# Patient Record
Sex: MALE | Race: OTHER | HISPANIC OR LATINO | URBAN - METROPOLITAN AREA
[De-identification: names, ages, dates, MRNs, and addresses within clinical notes are randomized per-mention and may not be internally consistent; named-entity substitution may affect disease eponyms.]

---
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
## 2022-01-12 NOTE — ED BEHAVIORAL HEALTH ASSESSMENT NOTE - NS ED BHA MSE SPEECH VOLUME
Pt needed school letter and proof of negative test. Letter sent to Dads email.     WILLIS Munoz      
Normal

## 2022-09-29 ENCOUNTER — EMERGENCY (EMERGENCY)
Facility: HOSPITAL | Age: 22
LOS: 1 days | Discharge: HOME | End: 2022-09-29
Payer: MEDICAID

## 2022-09-29 VITALS
RESPIRATION RATE: 20 BRPM | SYSTOLIC BLOOD PRESSURE: 112 MMHG | HEIGHT: 66 IN | HEART RATE: 87 BPM | WEIGHT: 106.92 LBS | TEMPERATURE: 98 F | OXYGEN SATURATION: 99 % | DIASTOLIC BLOOD PRESSURE: 74 MMHG

## 2022-09-29 PROCEDURE — 93010 ELECTROCARDIOGRAM REPORT: CPT

## 2022-09-29 PROCEDURE — 99284 EMERGENCY DEPT VISIT MOD MDM: CPT | Mod: 25

## 2022-09-29 PROCEDURE — 99211 OFF/OP EST MAY X REQ PHY/QHP: CPT

## 2022-09-29 NOTE — ED ADULT TRIAGE NOTE - CHIEF COMPLAINT QUOTE
here for agitated behavior and "trashing house." Also with thoughts of hurting himself. No plan. hx bipolar disorder and autism.

## 2022-09-30 VITALS
HEART RATE: 69 BPM | OXYGEN SATURATION: 98 % | TEMPERATURE: 98 F | RESPIRATION RATE: 15 BRPM | DIASTOLIC BLOOD PRESSURE: 67 MMHG | SYSTOLIC BLOOD PRESSURE: 100 MMHG

## 2022-09-30 DIAGNOSIS — F84.0 AUTISTIC DISORDER: ICD-10-CM

## 2022-09-30 LAB
ALBUMIN SERPL ELPH-MCNC: 5 G/DL — SIGNIFICANT CHANGE UP (ref 3.3–5)
ALP SERPL-CCNC: 65 U/L — SIGNIFICANT CHANGE UP (ref 40–120)
ALT FLD-CCNC: 10 U/L — SIGNIFICANT CHANGE UP (ref 10–45)
AMPHET UR-MCNC: POSITIVE
ANION GAP SERPL CALC-SCNC: 10 MMOL/L — SIGNIFICANT CHANGE UP (ref 5–17)
APAP SERPL-MCNC: <15 UG/ML — SIGNIFICANT CHANGE UP (ref 10–30)
APPEARANCE UR: CLEAR — SIGNIFICANT CHANGE UP
AST SERPL-CCNC: 18 U/L — SIGNIFICANT CHANGE UP (ref 10–40)
BACTERIA # UR AUTO: NEGATIVE — SIGNIFICANT CHANGE UP
BARBITURATES UR SCN-MCNC: NEGATIVE — SIGNIFICANT CHANGE UP
BASOPHILS # BLD AUTO: 0.02 K/UL — SIGNIFICANT CHANGE UP (ref 0–0.2)
BASOPHILS NFR BLD AUTO: 0.3 % — SIGNIFICANT CHANGE UP (ref 0–2)
BENZODIAZ UR-MCNC: NEGATIVE — SIGNIFICANT CHANGE UP
BILIRUB SERPL-MCNC: 0.4 MG/DL — SIGNIFICANT CHANGE UP (ref 0.2–1.2)
BILIRUB UR-MCNC: NEGATIVE — SIGNIFICANT CHANGE UP
BUN SERPL-MCNC: 14 MG/DL — SIGNIFICANT CHANGE UP (ref 7–23)
CALCIUM SERPL-MCNC: 9.7 MG/DL — SIGNIFICANT CHANGE UP (ref 8.4–10.5)
CHLORIDE SERPL-SCNC: 106 MMOL/L — SIGNIFICANT CHANGE UP (ref 96–108)
CO2 SERPL-SCNC: 25 MMOL/L — SIGNIFICANT CHANGE UP (ref 22–31)
COCAINE METAB.OTHER UR-MCNC: NEGATIVE — SIGNIFICANT CHANGE UP
COLOR SPEC: YELLOW — SIGNIFICANT CHANGE UP
CREAT SERPL-MCNC: 0.67 MG/DL — SIGNIFICANT CHANGE UP (ref 0.5–1.3)
DIFF PNL FLD: NEGATIVE — SIGNIFICANT CHANGE UP
EGFR: 137 ML/MIN/1.73M2 — SIGNIFICANT CHANGE UP
EOSINOPHIL # BLD AUTO: 0 K/UL — SIGNIFICANT CHANGE UP (ref 0–0.5)
EOSINOPHIL NFR BLD AUTO: 0 % — SIGNIFICANT CHANGE UP (ref 0–6)
EPI CELLS # UR: 1 /HPF — SIGNIFICANT CHANGE UP
ETHANOL SERPL-MCNC: <10 MG/DL — SIGNIFICANT CHANGE UP (ref 0–10)
GLUCOSE SERPL-MCNC: 97 MG/DL — SIGNIFICANT CHANGE UP (ref 70–99)
GLUCOSE UR QL: NEGATIVE — SIGNIFICANT CHANGE UP
HCT VFR BLD CALC: 42.5 % — SIGNIFICANT CHANGE UP (ref 39–50)
HGB BLD-MCNC: 13.4 G/DL — SIGNIFICANT CHANGE UP (ref 13–17)
HYALINE CASTS # UR AUTO: 8 /LPF — HIGH (ref 0–2)
IMM GRANULOCYTES NFR BLD AUTO: 0.3 % — SIGNIFICANT CHANGE UP (ref 0–0.9)
KETONES UR-MCNC: SIGNIFICANT CHANGE UP
LEUKOCYTE ESTERASE UR-ACNC: NEGATIVE — SIGNIFICANT CHANGE UP
LYMPHOCYTES # BLD AUTO: 1.73 K/UL — SIGNIFICANT CHANGE UP (ref 1–3.3)
LYMPHOCYTES # BLD AUTO: 27.2 % — SIGNIFICANT CHANGE UP (ref 13–44)
MCHC RBC-ENTMCNC: 27.6 PG — SIGNIFICANT CHANGE UP (ref 27–34)
MCHC RBC-ENTMCNC: 31.5 GM/DL — LOW (ref 32–36)
MCV RBC AUTO: 87.6 FL — SIGNIFICANT CHANGE UP (ref 80–100)
METHADONE UR-MCNC: NEGATIVE — SIGNIFICANT CHANGE UP
MONOCYTES # BLD AUTO: 0.68 K/UL — SIGNIFICANT CHANGE UP (ref 0–0.9)
MONOCYTES NFR BLD AUTO: 10.7 % — SIGNIFICANT CHANGE UP (ref 2–14)
NEUTROPHILS # BLD AUTO: 3.92 K/UL — SIGNIFICANT CHANGE UP (ref 1.8–7.4)
NEUTROPHILS NFR BLD AUTO: 61.5 % — SIGNIFICANT CHANGE UP (ref 43–77)
NITRITE UR-MCNC: NEGATIVE — SIGNIFICANT CHANGE UP
NRBC # BLD: 0 /100 WBCS — SIGNIFICANT CHANGE UP (ref 0–0)
OPIATES UR-MCNC: NEGATIVE — SIGNIFICANT CHANGE UP
OXYCODONE UR-MCNC: NEGATIVE — SIGNIFICANT CHANGE UP
PCP SPEC-MCNC: SIGNIFICANT CHANGE UP
PCP UR-MCNC: NEGATIVE — SIGNIFICANT CHANGE UP
PH UR: 7 — SIGNIFICANT CHANGE UP (ref 5–8)
PLATELET # BLD AUTO: 183 K/UL — SIGNIFICANT CHANGE UP (ref 150–400)
POTASSIUM SERPL-MCNC: 4.7 MMOL/L — SIGNIFICANT CHANGE UP (ref 3.5–5.3)
POTASSIUM SERPL-SCNC: 4.7 MMOL/L — SIGNIFICANT CHANGE UP (ref 3.5–5.3)
PROT SERPL-MCNC: 7.6 G/DL — SIGNIFICANT CHANGE UP (ref 6–8.3)
PROT UR-MCNC: ABNORMAL
RBC # BLD: 4.85 M/UL — SIGNIFICANT CHANGE UP (ref 4.2–5.8)
RBC # FLD: 15.8 % — HIGH (ref 10.3–14.5)
RBC CASTS # UR COMP ASSIST: 0 /HPF — SIGNIFICANT CHANGE UP (ref 0–4)
SALICYLATES SERPL-MCNC: <2 MG/DL — LOW (ref 15–30)
SARS-COV-2 RNA SPEC QL NAA+PROBE: SIGNIFICANT CHANGE UP
SODIUM SERPL-SCNC: 141 MMOL/L — SIGNIFICANT CHANGE UP (ref 135–145)
SP GR SPEC: 1.03 — HIGH (ref 1.01–1.02)
THC UR QL: NEGATIVE — SIGNIFICANT CHANGE UP
UROBILINOGEN FLD QL: ABNORMAL
VALPROATE SERPL-MCNC: 69 UG/ML — SIGNIFICANT CHANGE UP (ref 50–100)
WBC # BLD: 6.37 K/UL — SIGNIFICANT CHANGE UP (ref 3.8–10.5)
WBC # FLD AUTO: 6.37 K/UL — SIGNIFICANT CHANGE UP (ref 3.8–10.5)
WBC UR QL: 1 /HPF — SIGNIFICANT CHANGE UP (ref 0–5)

## 2022-09-30 PROCEDURE — 80053 COMPREHEN METABOLIC PANEL: CPT

## 2022-09-30 PROCEDURE — 93005 ELECTROCARDIOGRAM TRACING: CPT

## 2022-09-30 PROCEDURE — 85025 COMPLETE CBC W/AUTO DIFF WBC: CPT

## 2022-09-30 PROCEDURE — 87635 SARS-COV-2 COVID-19 AMP PRB: CPT

## 2022-09-30 PROCEDURE — 80164 ASSAY DIPROPYLACETIC ACD TOT: CPT

## 2022-09-30 PROCEDURE — 90792 PSYCH DIAG EVAL W/MED SRVCS: CPT | Mod: 95

## 2022-09-30 PROCEDURE — 81001 URINALYSIS AUTO W/SCOPE: CPT

## 2022-09-30 PROCEDURE — 99285 EMERGENCY DEPT VISIT HI MDM: CPT

## 2022-09-30 PROCEDURE — 80307 DRUG TEST PRSMV CHEM ANLYZR: CPT

## 2022-09-30 RX ORDER — OLANZAPINE 15 MG/1
1 TABLET, FILM COATED ORAL
Qty: 5 | Refills: 0
Start: 2022-09-30

## 2022-09-30 NOTE — ED ADULT NURSE REASSESSMENT NOTE - NS ED NURSE REASSESS COMMENT FT1
pt's behavior has been calm and no episodes of aggression since he was bib by ems. 1:1 CO for s/i maintained. awaiting to be evaluated by [facility].

## 2022-09-30 NOTE — ED PROVIDER NOTE - OBJECTIVE STATEMENT
22 year old male history of bipolar disorder and autism presents for aggressive behavior. Patient was at home awaiting a visit from his dad who lives in [place] canceled plans resulting in patient getting very upset. Started to break things around the house and become physically aggressive with sister and grandmother who lives in the home. Did patient did endorse suicidal ideation at the time of event however mother states this is on par with previous aggressive episodes that patient has had in the past currently. Currently patient is not endorsing suicidal ideation or homicidal ideation. Patient has no complaints at this time.

## 2022-09-30 NOTE — ED ADULT NURSE REASSESSMENT NOTE - NS ED NURSE REASSESS COMMENT FT1
Social work and psych MD at bedside to speak to patient and family member. Patient calm and cooperative. 1:1 at bedside within arms reach. Safety measures maintained.

## 2022-09-30 NOTE — ED ADULT NURSE REASSESSMENT NOTE - NS ED NURSE REASSESS COMMENT FT1
Discharge paperwork reviewed with mother. Mother concerned regarding safe discharge. MD Waldron made aware and asked to speak to patient prior to discharge. Patient belongings returned. Safety measures maintained. No

## 2022-09-30 NOTE — ED PROVIDER NOTE - CLINICAL SUMMARY MEDICAL DECISION MAKING FREE TEXT BOX
22 old male history of bipolar disorder and autism presents for aggressive behavior and concern for suicidal ideation. Currently no suicidal ideation endorses and patient at baseline behavioral status. Will obtain labs and monitor for behavioral changes.

## 2022-09-30 NOTE — ED BEHAVIORAL HEALTH ASSESSMENT NOTE - HPI (INCLUDE ILLNESS QUALITY, SEVERITY, DURATION, TIMING, CONTEXT, MODIFYING FACTORS, ASSOCIATED SIGNS AND SYMPTOMS)
21yo  male, domiciled with family, in 12th grade, PPH of autism, ADHD and mood disorder, 7 prior inpatient psychiatric admissions (last 2 years ago per chart, though this is not noted on PSYKES), pt reports in outpt treatment but doesn't know where or names of medications, reports mom gives him medications which he takes, prior history of SIB and aggression when agitated, no known history of drug or alcohol abuse, PMH of absence seizures, asthma and enuresis per chart, consult called to evaluate agitation.     Collateral obtained from mother. Mother reports that for the past two months patient has been up and down with his behaviors. she reports he has been impulsive and stressed out. He was transitioned to 9th grade in the same school and had a rough start due to some issues with bussing. Patient was arriving to school late, grades dropped and patient became upset. Patient is also to transition to another school this year or next year because once he turns 20 he has to leave [employer]. Patient is aware of this and makes him anxious. Since he has had a rough start he states that he wants out of the school. Mother reports compliance with his outpatient medications. Psychiatrist started him on an antidepressant (prozac) 1 month ago, but mother admits that she had only started him on it for 3 days. She reports that during this time the school has been reporting that patient has appeared inattentive - questioned mother if patient was possibly having an absence seizure and she reports she was thinking this may be a possibility and wanted to follow up with his neurologist. She reports that since he started the prozac he has been complaining of headaches. He was agitated yesterday at home and punched a table and was yelling, which mother reports has happened before. She reports after this episode patient took about half an hour to calm down then slept the rest of the night. He also complains of lightheadedness during this time and patient is always remorseful after he acts out and asks mother for forgiveness. Mother reports patient had a depakote level last 2 months ago and it was WNL. His thyroid was also checked at that time and found to be WNL. No reported suicidal ideation.    Patient interviewed alone. Patient reports that he "had a headache and acted out". He reports taking tylenol and that it did not help. He reports that yesterday's headache was the worst. Patient denies SI/HI/I/P. Patient reports some depression and that he does not feel like talking to his friends lately. Patient denies changes in weight or appetite. Denies anhedonia, crying spells. Patient denies any triggers. Patient reports taking prozac for the past three days and that it helped a bit then he had a headache once from it. Patient denies any psychotic symptoms including paranoia, ideas of reference, thought insertion/broadcasting, or auditory/visual/olfactory/tactile/gustatory hallucinations. Patient denies manic symptoms including elevated mood, increased irritability, mood lability, distractibility, grandiosity, pressured speech, increase in goal-directed activity, or decreased need for sleep. Patient expressed remorse for his actions and reports he gets agitated when he is in pain. Patient is future oriented and wants to be an .     Case d/w patient's outpatient neurologist Dr. Rhiannon Ko ([phone]). Discussed that patient has had a new onset period for the past two weeks of a headache with agitation and behavioral changes followed by periods of drowsiness where he will sleep for hours afterwards. Patient also appears to be inattentive at school during this time period as well. She reports that his last EEG was last month and was WNL and that he has had prior abnormal EEGs. She recommends that he go to the ED for an evaluation at this time and if cleared, can follow up with her at his next scheduled appointment. 23yo  male, domiciled with family, in 12th grade, PPH of autism, ADHD and mood disorder, 7 prior inpatient psychiatric admissions (last 2 years ago per chart, though this is not noted on PSYKES), pt reports in outpt treatment but doesn't know where or names of medications, reports mom gives him medications which he takes, prior history of SIB and aggression when agitated, no known history of drug or alcohol abuse, PMH of absence seizures, asthma and enuresis per chart, consult called to evaluate agitation.     Pt is a limited historian. He presents as regretful and embarrassed regarding events of tonight. He reported that he 'started trashing the house, ripping up papers' and that he 'had an argument with my sister and bit her'. He reported he was upset that his dad promised that he would take him somewhere, and he cancelled and didn't tell him why. He reports he feels 'calm' now, denies mood sx/SI/HI, denies current pain. Reports he would like to be 'an officer' when he gets older, and that he enjoys playing video games, and that this helps him calm down when he gets upset.

## 2022-09-30 NOTE — ED PROVIDER NOTE - NSFOLLOWUPINSTRUCTIONS_ED_ALL_ED_FT
Self-Destructive Behavior      Self-destructive behavior, or dysregulated behavior, refers to behaviors that can harm the person who does them. Self-destructive behavior is often used as a way to cope with painful emotions and stress. It is often habit-forming and difficult to control without help. Certain self-destructive behaviors can result in serious injury or death.    Self-destructive behavior can be a symptom of a mental health condition and this may put you at risk for thoughts of suicide in the future. Treatment may be required.      What are the risks?    Self-destructive behavior has risks, which include:  •Hurting yourself to release tension or lessen emotional pain. This includes banging your head, cutting, scratching, or burning yourself.    •Eating problems, such as:   •Eating very little or not eating at all (anorexia).      •Eating and making yourself vomit (purging).      •Eating too much in a short time period (binge eating).      •Using medicine, such as laxatives and water pills, to lose weight.        •Drinking too much alcohol.      •Abusing drugs.      •Going on shopping sprees, spending recklessly, or gambling until you go into debt.      •Any type of addictive behavior, including gambling and shoplifting.      •Not setting healthy limits. This includes denying yourself proper rest and food in order to meet the needs of others.      •Self-directed violence and other forms of self-harming behavior. This may include suicide.        Follow these instructions at home:  A bottle of beer, a glass of wine, and a glass of hard liquor with a "do not drink" sign over them.   •Avoid alcohol and drugs.      •Keep all follow-up visits. This is important.        General tips and recommendations  A young person talking with a counselor.    •Talk with your health care provider. He or she may refer you to a mental health counselor for talk therapy.      •Work with a counselor to help you recognize the source of your problems, sort out your feelings, and find strategies to cope with stress and your feelings.      •Try to distract yourself with other activities until you are calm and are able to seek help, these include exercising, keeping a journal, or speaking with a friend.    •Find healthy coping strategies that work for you, such as:  •Exercise.      •Spending time in nature.      •Journaling.      •Relaxation methods, such as deep breathing or yoga.        •Learn to identify and avoid the things that trigger your self-destructive behavior.      •Get involved in a local support group.        Where to find more information    Learn more about self-destructive behavior by visiting these websites:  •National [place] on Mental Illness: www.govind.org      •Centers for Disease Control and Prevention: www.cdc.gov        Contact a health care provider if:    •You become ill or you get injured as a result of self-destructive behavior.      •You are ready to seek treatment for addiction.      •You need help finding a support group.        Get help right away if:    •Your behavior interferes with daily activities, such as work or school.      •You have thoughts of hurting yourself or others.      If you ever feel like you may hurt yourself or others, or have thoughts about taking your own life, get help right away. Go to your nearest emergency department or:   • Call your local emergency services (883 in the [place]        •  Call a suicide crisis helpline, such as the National Suicide Prevention Lifeline at 1-877.694.8170 or 788 in the [place] This is open 24 hours a day in the [place]        • Text the Crisis Text Line at 463754 (in the [place]         Summary    •Self-destructive behavior includes behaviors, such as cutting, eating disorders, drug or alcohol abuse, and gambling. These may feel good in the moment, but can be harmful over time.      •Self-destructive behavior is often used as a way to deal with painful emotions and stress. It is often habit-forming and difficult to control without help.      •See your health care provider or counselor if you have thoughts of hurting yourself or hurting others. He or she will help you recognize the source of your problems, understand your feelings, and get the help you need.      This information is not intended to replace advice given to you by your health care provider. Make sure you discuss any questions you have with your health care provider.

## 2022-09-30 NOTE — ED BEHAVIORAL HEALTH ASSESSMENT NOTE - SUMMARY
21yo  male, domiciled with family, in 12th grade, PPH of autism, ADHD and mood disorder, 7 prior inpatient psychiatric admissions (last 2 years ago per chart, though this is not noted on PSYKES), pt reports in outpt treatment but doesn't know where or names of medications, reports mom gives him medications which he takes, prior history of SIB and aggression when agitated, no known history of drug or alcohol abuse, PMH of absence seizures, asthma and enuresis per chart, consult called to evaluate agitation. 23yo  male, domiciled with family, in 12th grade, PPH of autism, ADHD and mood disorder, 7 prior inpatient psychiatric admissions (last 2 years ago per chart, though this is not noted on PSYKES), pt reports in outpt treatment but doesn't know where or names of medications, reports mom gives him medications which he takes, prior history of SIB and aggression when agitated, no known history of drug or alcohol abuse, PMH of absence seizures, asthma and enuresis per chart, consult called to evaluate agitation. Pt presents as calm in ED, has not required medications. He reported he was able to sleep, and is not in any pain. He reported feeling regret and guilt regarding his actions this evening, which may best be understood in the context of chronic low frustration tolerance with limited ability to employ coping skills. His mother provided collateral that neurology was considering increase in VPA for seizures, however, this medication increase may be performed on an outpatient basis. The patient is psychiatrically cleared to leave the ED.

## 2022-09-30 NOTE — ED PROVIDER NOTE - PROGRESS NOTE DETAILS
Bashir PGY4: Patient reassessed, at baseline mental status, comfortable, psych cleared for DC. Strict return precautions given. Will dc and patient will follow up w/ op psych. Sabra Banegas PGY-3 patient reassessed, spoke with mom, not comfortable with patient being at home, concern for safety. patient currently behaving well, no indication to admit to hospital as per psych. will have sw and psych Sabra Banegas PGY-3 patient seen by psych, dispo plan reinforced, no indication for hospitalization at this time, refer to psych note. will discharge patient with, mom bedside.

## 2022-09-30 NOTE — ED PROVIDER NOTE - ATTENDING CONTRIBUTION TO CARE
MD Almonte:  patient seen and evaluated with the resident.  I was present for key portions of the History & Physical, and I agree with the Impression & Plan.  MD Almonte:  23 yo M, bib mother for evaluation of aggressive behavior and agitation at home.   Context:  patient was expecting his father to come to [place] to see him, but he cancelled at the last minute, which sent the patient into a rage.  Patient admits to endorsing SI in the moment when he was most angry, but he does NOT want to hurt himself, and he regrets ever saying that he would hurt himself.   VS: wnl.  Physical Exam: young adult M, very thin habitus, NAD, NCAT, PERRL, EOMI, neck supple, RRR, CTA B, Abd: s/nd/nt, Ext: no edema, Neuro: AAOx3, ambulates w/o diff, strength 5/5 & symmetric throughout.  Psych:  denies SI/HI at this time, no A/V hallucinations, normal affect.    Taking POs w/o difficulty.    Impression:  behavior disturbance with SI at home.  Plan:   medical clearance, reassess after ED BH evaluation via Telepsych.
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
## 2022-09-30 NOTE — ED ADULT NURSE REASSESSMENT NOTE - NS ED NURSE REASSESS COMMENT FT1
After conversation with social work and psychology patient safe to be discharged. No changes made to dc paperwork from this morning. Security called to return belongings. Safety measures maintained.

## 2022-09-30 NOTE — ED PROVIDER NOTE - PATIENT PORTAL LINK FT
You can access the FollowMyHealth Patient Portal offered by [facility] by registering at the following website: [email or web address]. By joining [employer]’s FollowMyHealth portal, you will also be able to view your health information using other applications (apps) compatible with our system. You can access the FollowMyHealth Patient Portal offered by [facility] by registering at the following website: [email or web address]. By joining [employer]’s FollowMyHealth portal, you will also be able to view your health information using other applications (apps) compatible with our system.
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
## 2022-09-30 NOTE — ED ADULT NURSE NOTE - DRUG PRE-SCREENING (DAST -1)
[Very Good] : ~his/her~  mood as very good [2 - 4 times a month (2 pts)] : 2-4 times a month (2 points) [1 or 2 (0 pts)] : 1 or 2 (0 points) [Never (0 pts)] : Never (0 points) [Yes] : In the past 12 months have you used drugs other than those required for medical reasons? Yes [No falls in past year] : Patient reported no falls in the past year [0] : 2) Feeling down, depressed, or hopeless: Not at all (0) [None] : None [With Family] : lives with family [Unemployed] : unemployed [] :  [# Of Children ___] : has [unfilled] children [Sexually Active] : sexually active [Feels Safe at Home] : Feels safe at home [Patient reported mammogram was normal] : Patient reported mammogram was normal [Patient reported colonoscopy was normal] : Patient reported colonoscopy was normal [HIV test declined] : HIV test declined [Hepatitis C test declined] : Hepatitis C test declined [Reviewed no changes] : Reviewed, no changes [Never] : Never [PHQ-2 Negative - No further assessment needed] : PHQ-2 Negative - No further assessment needed [Audit-CScore] : 2 [de-identified] : Marijuana  [[id number]] : 0 [Change in mental status noted] : No change in mental status noted [High Risk Behavior] : no high risk behavior [Reports changes in hearing] : Reports no changes in hearing [Reports changes in vision] : Reports no changes in vision [MammogramDate] : 07/21 [ColonoscopyDate] : 10/21 [de-identified] : Homemaker [AdvancecareDate] : 07/22 Statement Selected

## 2022-09-30 NOTE — ED BEHAVIORAL HEALTH ASSESSMENT NOTE - RISK ASSESSMENT
low. risks include prior inpatient admissions, history of aggression, poor frustration tolerance. Protective factors include no suicide attempts, medication compliance, no access to guns, no global insomnia, no substance abuse, supportive family, willingness to seek help, no suicidal ideation or homicidal ideation, hopefulness for future. Low Acute Suicide Risk

## 2022-09-30 NOTE — ED ADULT NURSE REASSESSMENT NOTE - NS ED NURSE REASSESS COMMENT FT1
pt. was interviewed in a private room via Telepsych w/ 1:1 staff in the room. pt. was cooperative w/ psychiatric assessment. pt. maintained on 1:1 CO for s/i.

## 2022-09-30 NOTE — ED PROVIDER NOTE - NS ED ROS FT
General: no fever, chills  HENT: no nasal congestion, no sore throat  Eyes: no visual changes, no blurred vision  Neck: no neck pain  CV: denies chest pain, no palpitations  Resp: no difficulty breathing, no cough  Abdominal: no nausea, no vomiting, no diarrhea, no abdominal pain  MSK: no muscle aches  Neuro: no headaches  Skin: no rashes
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
## 2022-09-30 NOTE — ED BEHAVIORAL HEALTH NOTE - BEHAVIORAL HEALTH NOTE
==================  PRE-HOSPITAL COURSE  ===================  SOURCE:  Secondhand ED documentation & ED provider report  DETAILS: Patient was BIB mother due to pt becoming agitated and aggressive after finding out that his trip to [place] to see his father was canceled.     =========  ED COURSE  =========  BEHAVIOR: Patient was described to be currently calm and cooperative presenting with linear thought process and thought content WNL, is AAAOx4, is not violent/aggressive in the ED, denies current SI/HI/AVH, does not present with marks/lacerations on the body, appears to maintain proper hygiene.   TREATMENT:  None  VISITORS: Mother is at bedside and remains appropriate in the ED.

## 2023-02-02 ENCOUNTER — HOSPITAL ENCOUNTER (EMERGENCY)
Dept: HOSPITAL 74 - FER | Age: 23
Discharge: HOME | End: 2023-02-02
Payer: COMMERCIAL

## 2023-02-02 VITALS — HEART RATE: 77 BPM | DIASTOLIC BLOOD PRESSURE: 88 MMHG | SYSTOLIC BLOOD PRESSURE: 127 MMHG | RESPIRATION RATE: 15 BRPM

## 2023-02-02 VITALS — BODY MASS INDEX: 17.6 KG/M2

## 2023-02-02 VITALS — TEMPERATURE: 97.3 F

## 2023-02-02 DIAGNOSIS — J98.01: Primary | ICD-10-CM

## 2023-02-02 DIAGNOSIS — R07.9: ICD-10-CM

## 2023-02-02 LAB
ALBUMIN SERPL-MCNC: 4.2 G/DL (ref 3.4–5)
ALP SERPL-CCNC: 52 U/L (ref 45–117)
ALT SERPL-CCNC: 10 U/L (ref 13–61)
ANION GAP SERPL CALC-SCNC: 13 MMOL/L (ref 8–16)
AST SERPL-CCNC: 16 U/L (ref 15–37)
BILIRUB SERPL-MCNC: 0.6 MG/DL (ref 0.2–1)
BUN SERPL-MCNC: 15 MG/DL (ref 7–18)
CALCIUM SERPL-MCNC: 8.9 MG/DL (ref 8.5–10)
CHLORIDE SERPL-SCNC: 105 MMOL/L (ref 98–107)
CO2 SERPL-SCNC: 22 MMOL/L (ref 21–32)
CREAT SERPL-MCNC: 0.8 MG/DL (ref 0.55–1.3)
DEPRECATED RDW RBC AUTO: 16.1 % (ref 11.9–15.9)
GLUCOSE SERPL-MCNC: 94 MG/DL (ref 74–106)
HCT VFR BLD CALC: 38.5 % (ref 35.4–49)
HGB BLD-MCNC: 13 G/DL (ref 11.7–16.9)
INR BLD: 1.29 (ref 0.83–1.09)
MCH RBC QN AUTO: 28.9 PG (ref 25.7–33.7)
MCHC RBC AUTO-ENTMCNC: 33.8 G/DL (ref 32–35.9)
MCV RBC: 85.5 FL (ref 80–96)
PLATELET # BLD AUTO: 146.9 10^3/UL (ref 134–434)
PLATELET BLD QL SMEAR: ADEQUATE
PMV BLD: 10.6 FL (ref 7.5–11.1)
PROT SERPL-MCNC: 6.8 G/DL (ref 6.4–8.2)
PT PNL PPP: 14.9 SEC (ref 9.7–13)
RBC # BLD AUTO: 4.5 10^6/UL (ref 4–5.6)
SODIUM SERPL-SCNC: 140 MMOL/L (ref 136–145)
WBC # BLD AUTO: 6.2 10^3/UL (ref 4–10.8)

## 2023-02-02 PROCEDURE — 3E0F7GC INTRODUCTION OF OTHER THERAPEUTIC SUBSTANCE INTO RESPIRATORY TRACT, VIA NATURAL OR ARTIFICIAL OPENING: ICD-10-PCS | Performed by: EMERGENCY MEDICINE

## 2023-02-02 RX ADMIN — IPRATROPIUM BROMIDE AND ALBUTEROL SULFATE SCH AMP: .5; 3 SOLUTION RESPIRATORY (INHALATION) at 16:07

## 2023-02-02 RX ADMIN — IPRATROPIUM BROMIDE AND ALBUTEROL SULFATE SCH AMP: .5; 3 SOLUTION RESPIRATORY (INHALATION) at 16:28

## 2023-02-02 RX ADMIN — IPRATROPIUM BROMIDE AND ALBUTEROL SULFATE SCH AMP: .5; 3 SOLUTION RESPIRATORY (INHALATION) at 16:49

## 2023-03-07 ENCOUNTER — EMERGENCY (EMERGENCY)
Facility: HOSPITAL | Age: 23
LOS: 1 days | Discharge: HOME | End: 2023-03-07
Attending: EMERGENCY MEDICINE
Payer: MEDICAID

## 2023-03-07 VITALS
TEMPERATURE: 98 F | HEART RATE: 67 BPM | SYSTOLIC BLOOD PRESSURE: 113 MMHG | HEIGHT: 66 IN | OXYGEN SATURATION: 100 % | WEIGHT: 49.82 LBS | RESPIRATION RATE: 17 BRPM | DIASTOLIC BLOOD PRESSURE: 67 MMHG

## 2023-03-07 VITALS
RESPIRATION RATE: 19 BRPM | SYSTOLIC BLOOD PRESSURE: 93 MMHG | OXYGEN SATURATION: 99 % | DIASTOLIC BLOOD PRESSURE: 53 MMHG | HEART RATE: 98 BPM | TEMPERATURE: 99 F

## 2023-03-07 LAB
ALBUMIN SERPL ELPH-MCNC: 3.4 G/DL — LOW (ref 3.5–5)
ALP SERPL-CCNC: 55 U/L — SIGNIFICANT CHANGE UP (ref 40–120)
ALT FLD-CCNC: 16 U/L DA — SIGNIFICANT CHANGE UP (ref 10–60)
ANION GAP SERPL CALC-SCNC: 3 MMOL/L — LOW (ref 5–17)
AST SERPL-CCNC: 18 U/L — SIGNIFICANT CHANGE UP (ref 10–40)
BASOPHILS # BLD AUTO: 0.01 K/UL — SIGNIFICANT CHANGE UP (ref 0–0.2)
BASOPHILS NFR BLD AUTO: 0.2 % — SIGNIFICANT CHANGE UP (ref 0–2)
BILIRUB SERPL-MCNC: 0.2 MG/DL — SIGNIFICANT CHANGE UP (ref 0.2–1.2)
BUN SERPL-MCNC: 10 MG/DL — SIGNIFICANT CHANGE UP (ref 7–18)
CALCIUM SERPL-MCNC: 8.7 MG/DL — SIGNIFICANT CHANGE UP (ref 8.4–10.5)
CHLORIDE SERPL-SCNC: 112 MMOL/L — HIGH (ref 96–108)
CO2 SERPL-SCNC: 26 MMOL/L — SIGNIFICANT CHANGE UP (ref 22–31)
CREAT SERPL-MCNC: 0.71 MG/DL — SIGNIFICANT CHANGE UP (ref 0.5–1.3)
EGFR: 135 ML/MIN/1.73M2 — SIGNIFICANT CHANGE UP
EOSINOPHIL # BLD AUTO: 0.02 K/UL — SIGNIFICANT CHANGE UP (ref 0–0.5)
EOSINOPHIL NFR BLD AUTO: 0.5 % — SIGNIFICANT CHANGE UP (ref 0–6)
GLUCOSE SERPL-MCNC: 108 MG/DL — HIGH (ref 70–99)
HCT VFR BLD CALC: 39.5 % — SIGNIFICANT CHANGE UP (ref 39–50)
HGB BLD-MCNC: 12.6 G/DL — LOW (ref 13–17)
IMM GRANULOCYTES NFR BLD AUTO: 0.2 % — SIGNIFICANT CHANGE UP (ref 0–0.9)
LYMPHOCYTES # BLD AUTO: 2.2 K/UL — SIGNIFICANT CHANGE UP (ref 1–3.3)
LYMPHOCYTES # BLD AUTO: 53.9 % — HIGH (ref 13–44)
MAGNESIUM SERPL-MCNC: 2.1 MG/DL — SIGNIFICANT CHANGE UP (ref 1.6–2.6)
MCHC RBC-ENTMCNC: 28.4 PG — SIGNIFICANT CHANGE UP (ref 27–34)
MCHC RBC-ENTMCNC: 31.9 GM/DL — LOW (ref 32–36)
MCV RBC AUTO: 89 FL — SIGNIFICANT CHANGE UP (ref 80–100)
MONOCYTES # BLD AUTO: 0.52 K/UL — SIGNIFICANT CHANGE UP (ref 0–0.9)
MONOCYTES NFR BLD AUTO: 12.7 % — SIGNIFICANT CHANGE UP (ref 2–14)
NEUTROPHILS # BLD AUTO: 1.32 K/UL — LOW (ref 1.8–7.4)
NEUTROPHILS NFR BLD AUTO: 32.5 % — LOW (ref 43–77)
NRBC # BLD: 0 /100 WBCS — SIGNIFICANT CHANGE UP (ref 0–0)
PLATELET # BLD AUTO: 177 K/UL — SIGNIFICANT CHANGE UP (ref 150–400)
POTASSIUM SERPL-MCNC: 4 MMOL/L — SIGNIFICANT CHANGE UP (ref 3.5–5.3)
POTASSIUM SERPL-SCNC: 4 MMOL/L — SIGNIFICANT CHANGE UP (ref 3.5–5.3)
PROT SERPL-MCNC: 6.5 G/DL — SIGNIFICANT CHANGE UP (ref 6–8.3)
RBC # BLD: 4.44 M/UL — SIGNIFICANT CHANGE UP (ref 4.2–5.8)
RBC # FLD: 15.9 % — HIGH (ref 10.3–14.5)
SODIUM SERPL-SCNC: 141 MMOL/L — SIGNIFICANT CHANGE UP (ref 135–145)
VALPROATE SERPL-MCNC: 104 UG/ML — HIGH (ref 50–100)
WBC # BLD: 4.08 K/UL — SIGNIFICANT CHANGE UP (ref 3.8–10.5)
WBC # FLD AUTO: 4.08 K/UL — SIGNIFICANT CHANGE UP (ref 3.8–10.5)

## 2023-03-07 PROCEDURE — 96374 THER/PROPH/DIAG INJ IV PUSH: CPT

## 2023-03-07 PROCEDURE — 96375 TX/PRO/DX INJ NEW DRUG ADDON: CPT

## 2023-03-07 PROCEDURE — 83735 ASSAY OF MAGNESIUM: CPT

## 2023-03-07 PROCEDURE — 99284 EMERGENCY DEPT VISIT MOD MDM: CPT

## 2023-03-07 PROCEDURE — 80053 COMPREHEN METABOLIC PANEL: CPT

## 2023-03-07 PROCEDURE — 80164 ASSAY DIPROPYLACETIC ACD TOT: CPT

## 2023-03-07 PROCEDURE — 99284 EMERGENCY DEPT VISIT MOD MDM: CPT | Mod: 25

## 2023-03-07 PROCEDURE — 85025 COMPLETE CBC W/AUTO DIFF WBC: CPT

## 2023-03-07 PROCEDURE — 36415 COLL VENOUS BLD VENIPUNCTURE: CPT

## 2023-03-07 RX ORDER — METOCLOPRAMIDE HCL 10 MG
10 TABLET ORAL ONCE
Refills: 0 | Status: COMPLETED | OUTPATIENT
Start: 2023-03-07 | End: 2023-03-07

## 2023-03-07 RX ORDER — KETOROLAC TROMETHAMINE 30 MG/ML
15 SYRINGE (ML) INJECTION ONCE
Refills: 0 | Status: DISCONTINUED | OUTPATIENT
Start: 2023-03-07 | End: 2023-03-07

## 2023-03-07 RX ORDER — MAGNESIUM SULFATE 500 MG/ML
1 VIAL (ML) INJECTION ONCE
Refills: 0 | Status: COMPLETED | OUTPATIENT
Start: 2023-03-07 | End: 2023-03-07

## 2023-03-07 RX ORDER — SODIUM CHLORIDE 9 MG/ML
1000 INJECTION INTRAMUSCULAR; INTRAVENOUS; SUBCUTANEOUS ONCE
Refills: 0 | Status: COMPLETED | OUTPATIENT
Start: 2023-03-07 | End: 2023-03-07

## 2023-03-07 RX ADMIN — SODIUM CHLORIDE 1000 MILLILITER(S): 9 INJECTION INTRAMUSCULAR; INTRAVENOUS; SUBCUTANEOUS at 15:57

## 2023-03-07 RX ADMIN — Medication 200 GRAM(S): at 15:57

## 2023-03-07 RX ADMIN — Medication 15 MILLIGRAM(S): at 15:57

## 2023-03-07 RX ADMIN — Medication 15 MILLIGRAM(S): at 16:09

## 2023-03-07 RX ADMIN — Medication 104 MILLIGRAM(S): at 15:57

## 2023-03-07 NOTE — ED PROVIDER NOTE - CLINICAL SUMMARY MEDICAL DECISION MAKING FREE TEXT BOX
22 yr old male with hx of absent seizure on depakote 500mg BID x 1 week now as prior was on 625mg BID, Bipolar, ADHD, and severe weigh fluctuations presents to ed with mother for frontal headache with nausea, arm pain, dizziness on and off 1 month. mother requesting to have EEG done. no fever, no abd pain, no trauma. Seeing adult neuro- Dr Enrique    headache possibly migraine vs tension. clinically pt is not having active seizure. pt known seizure d/o making an EEG in ed clinically not significant. will check depakote level and basic labs. fluids, migraine cocktail, likely dc with neuro outpt follow up. pt neurologically at baseline no need for imaging

## 2023-03-07 NOTE — ED PROVIDER NOTE - PROGRESS NOTE DETAILS
montero: pt neurologically intact. well appearing. no seizure activity.  104 high normal 100. dc with neuro follow up

## 2023-03-07 NOTE — ED PROVIDER NOTE - NSFOLLOWUPINSTRUCTIONS_ED_ALL_ED_FT
Your headache is likely migraine induced. Please stay hydrated. continue with home meds and see your neurologist

## 2023-03-07 NOTE — ED PROVIDER NOTE - PATIENT PORTAL LINK FT
You can access the FollowMyHealth Patient Portal offered by [facility] by registering at the following website: [email or web address]. By joining [employer]’s FollowMyHealth portal, you will also be able to view your health information using other applications (apps) compatible with our system.

## 2023-03-07 NOTE — ED PROVIDER NOTE - ENMT, MLM
Airway patent, Nasal mucosa clear. Mouth with normal mucosa. Throat has no vesicles, no oropharyngeal exudates and uvula is midline. no tongue lac

## 2023-03-07 NOTE — ED PROVIDER NOTE - OBJECTIVE STATEMENT
22 yr old male with hx of absent seizure on depakote 500mg BID x 1 week now as prior was on 625mg BID, Bipolar, ADHD, and severe weigh fluctuations presents to ed with mother for frontal headache with nausea, arm pain, dizziness on and off 1 month. mother requesting to have EEG done. no fever, no abd pain, no trauma. Seeing adult neuro- Dr Enrique

## 2023-03-07 NOTE — ED ADULT NURSE NOTE - OBJECTIVE STATEMENT
22 yr old male patient presents to the ER AOX4 complains of headaches on and off for the past month. Hist of silent  seizure, mild autistic and bipolar. Denied any fever, nausea, vomiting and dizziness. Patient sated that his headaches is not as bad as it was this morning because mom gives him tylenol. Will continue to monitor for nay changes.

## 2024-04-03 ENCOUNTER — EMERGENCY (EMERGENCY)
Facility: HOSPITAL | Age: 24
LOS: 1 days | Discharge: HOME | End: 2024-04-03
Attending: EMERGENCY MEDICINE
Payer: MEDICAID

## 2024-04-03 VITALS
TEMPERATURE: 97 F | HEART RATE: 75 BPM | WEIGHT: 122.36 LBS | DIASTOLIC BLOOD PRESSURE: 67 MMHG | OXYGEN SATURATION: 97 % | SYSTOLIC BLOOD PRESSURE: 97 MMHG | RESPIRATION RATE: 18 BRPM

## 2024-04-03 VITALS
RESPIRATION RATE: 18 BRPM | OXYGEN SATURATION: 98 % | SYSTOLIC BLOOD PRESSURE: 97 MMHG | DIASTOLIC BLOOD PRESSURE: 65 MMHG | TEMPERATURE: 98 F | HEART RATE: 74 BPM

## 2024-04-03 DIAGNOSIS — Z90.89 ACQUIRED ABSENCE OF OTHER ORGANS: Chronic | ICD-10-CM

## 2024-04-03 LAB
ALBUMIN SERPL ELPH-MCNC: 2.9 G/DL — LOW (ref 3.5–5)
ALP SERPL-CCNC: 47 U/L — SIGNIFICANT CHANGE UP (ref 40–120)
ALT FLD-CCNC: 17 U/L DA — SIGNIFICANT CHANGE UP (ref 10–60)
ANION GAP SERPL CALC-SCNC: 6 MMOL/L — SIGNIFICANT CHANGE UP (ref 5–17)
ANISOCYTOSIS BLD QL: SLIGHT — SIGNIFICANT CHANGE UP
APPEARANCE UR: CLEAR — SIGNIFICANT CHANGE UP
AST SERPL-CCNC: 11 U/L — SIGNIFICANT CHANGE UP (ref 10–40)
BASOPHILS # BLD AUTO: 0 K/UL — SIGNIFICANT CHANGE UP (ref 0–0.2)
BASOPHILS NFR BLD AUTO: 0 % — SIGNIFICANT CHANGE UP (ref 0–2)
BILIRUB SERPL-MCNC: 0.3 MG/DL — SIGNIFICANT CHANGE UP (ref 0.2–1.2)
BILIRUB UR-MCNC: NEGATIVE — SIGNIFICANT CHANGE UP
BUN SERPL-MCNC: 13 MG/DL — SIGNIFICANT CHANGE UP (ref 7–18)
CALCIUM SERPL-MCNC: 8.8 MG/DL — SIGNIFICANT CHANGE UP (ref 8.4–10.5)
CHLORIDE SERPL-SCNC: 110 MMOL/L — HIGH (ref 96–108)
CO2 SERPL-SCNC: 22 MMOL/L — SIGNIFICANT CHANGE UP (ref 22–31)
COLOR SPEC: SIGNIFICANT CHANGE UP
CREAT SERPL-MCNC: 0.84 MG/DL — SIGNIFICANT CHANGE UP (ref 0.5–1.3)
DIFF PNL FLD: NEGATIVE — SIGNIFICANT CHANGE UP
EGFR: 127 ML/MIN/1.73M2 — SIGNIFICANT CHANGE UP
EOSINOPHIL # BLD AUTO: 0 K/UL — SIGNIFICANT CHANGE UP (ref 0–0.5)
EOSINOPHIL NFR BLD AUTO: 0 % — SIGNIFICANT CHANGE UP (ref 0–6)
GLUCOSE SERPL-MCNC: 99 MG/DL — SIGNIFICANT CHANGE UP (ref 70–99)
GLUCOSE UR QL: NEGATIVE MG/DL — SIGNIFICANT CHANGE UP
HCT VFR BLD CALC: 40.8 % — SIGNIFICANT CHANGE UP (ref 39–50)
HGB BLD-MCNC: 13.2 G/DL — SIGNIFICANT CHANGE UP (ref 13–17)
KETONES UR-MCNC: 80 MG/DL
LEUKOCYTE ESTERASE UR-ACNC: NEGATIVE — SIGNIFICANT CHANGE UP
LG PLATELETS BLD QL AUTO: SLIGHT — SIGNIFICANT CHANGE UP
LIDOCAIN IGE QN: 21 U/L — SIGNIFICANT CHANGE UP (ref 13–75)
LYMPHOCYTES # BLD AUTO: 1.69 K/UL — SIGNIFICANT CHANGE UP (ref 1–3.3)
LYMPHOCYTES # BLD AUTO: 21 % — SIGNIFICANT CHANGE UP (ref 13–44)
MANUAL SMEAR VERIFICATION: SIGNIFICANT CHANGE UP
MCHC RBC-ENTMCNC: 27.5 PG — SIGNIFICANT CHANGE UP (ref 27–34)
MCHC RBC-ENTMCNC: 32.4 GM/DL — SIGNIFICANT CHANGE UP (ref 32–36)
MCV RBC AUTO: 85 FL — SIGNIFICANT CHANGE UP (ref 80–100)
METAMYELOCYTES # FLD: 2 % — HIGH (ref 0–0)
MICROCYTES BLD QL: SLIGHT — SIGNIFICANT CHANGE UP
MONOCYTES # BLD AUTO: 1.53 K/UL — HIGH (ref 0–0.9)
MONOCYTES NFR BLD AUTO: 19 % — HIGH (ref 2–14)
MYELOCYTES NFR BLD: 1 % — HIGH (ref 0–0)
NEUTROPHILS # BLD AUTO: 4.52 K/UL — SIGNIFICANT CHANGE UP (ref 1.8–7.4)
NEUTROPHILS NFR BLD AUTO: 53 % — SIGNIFICANT CHANGE UP (ref 43–77)
NEUTS BAND # BLD: 3 % — SIGNIFICANT CHANGE UP (ref 0–8)
NITRITE UR-MCNC: NEGATIVE — SIGNIFICANT CHANGE UP
NRBC # BLD: 0 /100 WBCS — SIGNIFICANT CHANGE UP (ref 0–0)
PH UR: 5.5 — SIGNIFICANT CHANGE UP (ref 5–8)
PLAT MORPH BLD: NORMAL — SIGNIFICANT CHANGE UP
PLATELET # BLD AUTO: 206 K/UL — SIGNIFICANT CHANGE UP (ref 150–400)
POIKILOCYTOSIS BLD QL AUTO: SLIGHT — SIGNIFICANT CHANGE UP
POTASSIUM SERPL-MCNC: 3.9 MMOL/L — SIGNIFICANT CHANGE UP (ref 3.5–5.3)
POTASSIUM SERPL-SCNC: 3.9 MMOL/L — SIGNIFICANT CHANGE UP (ref 3.5–5.3)
PROT SERPL-MCNC: 6.4 G/DL — SIGNIFICANT CHANGE UP (ref 6–8.3)
PROT UR-MCNC: NEGATIVE MG/DL — SIGNIFICANT CHANGE UP
RBC # BLD: 4.8 M/UL — SIGNIFICANT CHANGE UP (ref 4.2–5.8)
RBC # FLD: 16.7 % — HIGH (ref 10.3–14.5)
RBC BLD AUTO: ABNORMAL
SODIUM SERPL-SCNC: 138 MMOL/L — SIGNIFICANT CHANGE UP (ref 135–145)
SP GR SPEC: 1.02 — SIGNIFICANT CHANGE UP (ref 1–1.03)
TROPONIN I, HIGH SENSITIVITY RESULT: 3.4 NG/L — SIGNIFICANT CHANGE UP
UROBILINOGEN FLD QL: 0.2 MG/DL — SIGNIFICANT CHANGE UP (ref 0.2–1)
VARIANT LYMPHS # BLD: 1 % — SIGNIFICANT CHANGE UP (ref 0–6)
WBC # BLD: 8.07 K/UL — SIGNIFICANT CHANGE UP (ref 3.8–10.5)
WBC # FLD AUTO: 8.07 K/UL — SIGNIFICANT CHANGE UP (ref 3.8–10.5)

## 2024-04-03 PROCEDURE — 83690 ASSAY OF LIPASE: CPT

## 2024-04-03 PROCEDURE — 71045 X-RAY EXAM CHEST 1 VIEW: CPT

## 2024-04-03 PROCEDURE — 36415 COLL VENOUS BLD VENIPUNCTURE: CPT

## 2024-04-03 PROCEDURE — 99285 EMERGENCY DEPT VISIT HI MDM: CPT

## 2024-04-03 PROCEDURE — 96374 THER/PROPH/DIAG INJ IV PUSH: CPT

## 2024-04-03 PROCEDURE — 99285 EMERGENCY DEPT VISIT HI MDM: CPT | Mod: 25

## 2024-04-03 PROCEDURE — 71045 X-RAY EXAM CHEST 1 VIEW: CPT | Mod: 26

## 2024-04-03 PROCEDURE — 93005 ELECTROCARDIOGRAM TRACING: CPT

## 2024-04-03 PROCEDURE — 87177 OVA AND PARASITES SMEARS: CPT

## 2024-04-03 PROCEDURE — 80053 COMPREHEN METABOLIC PANEL: CPT

## 2024-04-03 PROCEDURE — 74176 CT ABD & PELVIS W/O CONTRAST: CPT | Mod: MC

## 2024-04-03 PROCEDURE — 84484 ASSAY OF TROPONIN QUANT: CPT

## 2024-04-03 PROCEDURE — 87507 IADNA-DNA/RNA PROBE TQ 12-25: CPT

## 2024-04-03 PROCEDURE — 85025 COMPLETE CBC W/AUTO DIFF WBC: CPT

## 2024-04-03 PROCEDURE — 74176 CT ABD & PELVIS W/O CONTRAST: CPT | Mod: 26,MC

## 2024-04-03 PROCEDURE — 81003 URINALYSIS AUTO W/O SCOPE: CPT

## 2024-04-03 PROCEDURE — 96361 HYDRATE IV INFUSION ADD-ON: CPT

## 2024-04-03 RX ORDER — ACETAMINOPHEN 500 MG
1000 TABLET ORAL ONCE
Refills: 0 | Status: COMPLETED | OUTPATIENT
Start: 2024-04-03 | End: 2024-04-03

## 2024-04-03 RX ORDER — IOHEXOL 300 MG/ML
30 INJECTION, SOLUTION INTRAVENOUS ONCE
Refills: 0 | Status: COMPLETED | OUTPATIENT
Start: 2024-04-03 | End: 2024-04-03

## 2024-04-03 RX ORDER — SODIUM CHLORIDE 9 MG/ML
1000 INJECTION INTRAMUSCULAR; INTRAVENOUS; SUBCUTANEOUS ONCE
Refills: 0 | Status: COMPLETED | OUTPATIENT
Start: 2024-04-03 | End: 2024-04-03

## 2024-04-03 RX ADMIN — Medication 1000 MILLIGRAM(S): at 18:36

## 2024-04-03 RX ADMIN — IOHEXOL 30 MILLILITER(S): 300 INJECTION, SOLUTION INTRAVENOUS at 18:23

## 2024-04-03 RX ADMIN — Medication 400 MILLIGRAM(S): at 18:06

## 2024-04-03 RX ADMIN — Medication 1000 MILLIGRAM(S): at 18:21

## 2024-04-03 RX ADMIN — SODIUM CHLORIDE 1000 MILLILITER(S): 9 INJECTION INTRAMUSCULAR; INTRAVENOUS; SUBCUTANEOUS at 19:06

## 2024-04-03 RX ADMIN — SODIUM CHLORIDE 1000 MILLILITER(S): 9 INJECTION INTRAMUSCULAR; INTRAVENOUS; SUBCUTANEOUS at 18:06

## 2024-04-03 NOTE — ED PROVIDER NOTE - PATIENT PORTAL LINK FT
You can access the FollowMyHealth Patient Portal offered by [facility] by registering at the following website: [email or web address]. By joining [employer]s FollowMyHealth portal, you will also be able to view your health information using other applications (apps) compatible with our system.

## 2024-04-03 NOTE — ED ADULT NURSE NOTE - NSFALLUNIVINTERV_ED_ALL_ED
Bed/Stretcher in lowest position, wheels locked, appropriate side rails in place/Call bell, personal items and telephone in reach/Instruct patient to call for assistance before getting out of bed/chair/stretcher/Non-slip footwear applied when patient is off stretcher/Orma to call system/Physically safe environment - no spills, clutter or unnecessary equipment/Purposeful proactive rounding/Room/bathroom lighting operational, light cord in reach

## 2024-04-03 NOTE — ED PROVIDER NOTE - CLINICAL SUMMARY MEDICAL DECISION MAKING FREE TEXT BOX
23 year-old male, presents with cc abdominal pain, chest pain (intermittent) and diarrhea x 1 week. Well-appearing, vital signs within normal limits, afebrile. Abdo soft, mild guarding x4Q and LLQ tenderness to palpation. Plan for CT t/o r/o colitis vs diverticulitis vs epiploic appendagitis. Low suspicion of torsion or renal stone. Also plan for labs, urine, IVF, meds, serial exam, re-assess. 23 year-old male, presents with cc abdominal pain, chest pain (intermittent) and diarrhea x 1 week. Well-appearing, vital signs within normal limits, afebrile. Abdo soft, mild guarding x4Q and LLQ tenderness to palpation. Plan for CT t/o r/o colitis vs diverticulitis vs epiploic appendagitis. Low suspicion of torsion or renal stone. Also plan for labs, urine, IVF, meds, serial exam, re-assess.    20:42-Pt well-appearing. CXR NAD. ECG NSR. Labs grossly unremarkable. UA shoes mild dehydration. Tolerated PO intake. Sent stool for GI PCR/P&P. Will advise GI follow up. Discussed red flags to come back to the ER.

## 2024-04-03 NOTE — ED PROVIDER NOTE - NSFOLLOWUPINSTRUCTIONS_ED_ALL_ED_FT
Follow up with the primary care doctor within 2-3 days.  Follow up with the GI within 1 week.    If you experience any new or worsening symptoms or if you are concerned you can always come back to the emergency for a re-evaluation.    **Some results may not be available at the time of your discharge from the hospital. You can download the FOLLOW MY HEALTH [facility] and have access to these results.

## 2024-04-03 NOTE — ED ADULT NURSE NOTE - NS ED NURSE LEVEL OF CONSCIOUSNESS ORIENTATION
Rell Mcfadden is a 52 year old male presenting for   Chief Complaint   Patient presents with    Office Visit    Establish Care     Denies Latex allergy or sensitivity.    Medication verified and med list updated  Refills needed today: No    Health Maintenance Due   Topic Date Due    COVID-19 Vaccine (1) Never done    Hepatitis B Vaccine (3 of 3 - 19+ 3-dose series) 04/22/2003    Colorectal Cancer Screen-  Never done    DTaP/Tdap/Td Vaccine (3 - Td or Tdap) 05/14/2023    Influenza Vaccine (1) 09/01/2023    Depression Screening  01/19/2024    Shingles Vaccine (1 of 2) Never done       Patient is due for topics listed above, he wishes to proceed with Depression Screening , but is not proceeding with Immunization(s) COVID-19, Dtap/Tdap/Td, Hep B, Influenza, and Shingles and Colorectal Cancer Screening: Colonoscopy at this time. The following has occurred: declined    Depression Screening:  Review Flowsheet  More data exists         3/20/2024   PHQ 2/9 Score   Adult PHQ 2 Score 0   Adult PHQ 2 Interpretation No further screening needed   Little interest or pleasure in activity? Not at all   Feeling down, depressed or hopeless? Not at all        Addressed the Advanced Care Planning (Advance Directives):  Yes   Oriented - self; Oriented - place; Oriented - time

## 2024-04-03 NOTE — ED PROVIDER NOTE - NSICDXPASTMEDICALHX_GEN_ALL_CORE_FT
PAST MEDICAL HISTORY:  ADHD     Asthma     Autism     Bipolar disorder     H/O constipation     Hypothyroidism     Seizure

## 2024-04-03 NOTE — ED PROVIDER NOTE - OBJECTIVE STATEMENT
23-year-old male, history of Asthma, bipolar disorder, ADHD, autism, seizures, constipation, presents with mother for evaluation of left-sided abdominal pain and nonbloody diarrhea for 1 week.  initially had abdominal pain and diarrhea for 2 weeks which resolved and then did not have any symptoms for 10 days.  Now gradual onset of left-sided abdominal pain and diarrhea again.  No fever, chills or night sweats.  Denies any urinary symptoms or testicular pain.  No rash.  No recent febrile or viral syndrome. Also reports having intermittent sharp midsternal chest pains for 1 week.  Chest pain without any specific triggering factors.  Eating food does not make the chest or abdominal pain worse.  Denies any BRBPR or melena.

## 2024-04-04 ENCOUNTER — TRANSCRIPTION ENCOUNTER (OUTPATIENT)
Age: 24
End: 2024-04-04

## 2024-04-04 LAB — GI PCR PANEL: SIGNIFICANT CHANGE UP

## 2024-04-07 LAB
CULTURE RESULTS: SIGNIFICANT CHANGE UP
SPECIMEN SOURCE: SIGNIFICANT CHANGE UP

## 2024-11-20 NOTE — ED PEDIATRIC NURSE NOTE - READING LANGUAGE PREFERRED
In an effort to ensure that our patients [employer], a Team Member has reviewed your chart and identified an opportunity to provide the best care possible. An attempt was made to discuss or schedule due or overdue Preventive or Chronic Condition care.Care Gaps identified: Colorectal Cancer Screening.    The Outcome was Contact was not made, letter/portal message sent.  We are attempting to schedule a follow up office visit. If you have any questions or need help with scheduling, contact your primary care provider..   Type of Appointment needed: Primary Care Visit   English

## 2025-05-06 PROBLEM — J45.909 UNSPECIFIED ASTHMA, UNCOMPLICATED: Chronic | Status: ACTIVE | Noted: 2024-04-03

## 2025-05-06 PROBLEM — E03.9 HYPOTHYROIDISM, UNSPECIFIED: Chronic | Status: ACTIVE | Noted: 2024-04-03

## 2025-05-06 PROBLEM — F90.9 ATTENTION-DEFICIT HYPERACTIVITY DISORDER, UNSPECIFIED TYPE: Chronic | Status: ACTIVE | Noted: 2024-04-03

## 2025-05-06 PROBLEM — R56.9 UNSPECIFIED CONVULSIONS: Chronic | Status: ACTIVE | Noted: 2024-04-03

## 2025-05-06 PROBLEM — F31.9 BIPOLAR DISORDER, UNSPECIFIED: Chronic | Status: ACTIVE | Noted: 2024-04-03

## 2025-05-06 PROBLEM — F84.0 AUTISTIC DISORDER: Chronic | Status: ACTIVE | Noted: 2024-04-03

## 2025-05-06 PROBLEM — Z87.19 PERSONAL HISTORY OF OTHER DISEASES OF THE DIGESTIVE SYSTEM: Chronic | Status: ACTIVE | Noted: 2024-04-03

## 2025-05-07 ENCOUNTER — EMERGENCY (EMERGENCY)
Facility: HOSPITAL | Age: 25
LOS: 1 days | End: 2025-05-07
Attending: EMERGENCY MEDICINE | Admitting: EMERGENCY MEDICINE
Payer: MEDICAID

## 2025-05-07 VITALS
SYSTOLIC BLOOD PRESSURE: 110 MMHG | RESPIRATION RATE: 18 BRPM | WEIGHT: 126.1 LBS | HEART RATE: 106 BPM | DIASTOLIC BLOOD PRESSURE: 73 MMHG | TEMPERATURE: 98 F | HEIGHT: 68 IN | OXYGEN SATURATION: 98 %

## 2025-05-07 DIAGNOSIS — Z90.89 ACQUIRED ABSENCE OF OTHER ORGANS: Chronic | ICD-10-CM

## 2025-05-07 PROCEDURE — 99282 EMERGENCY DEPT VISIT SF MDM: CPT

## 2025-05-07 NOTE — ED PROVIDER NOTE - OBJECTIVE STATEMENT
Rogelio: Autism. Recent L buttock abscess. Went to [facility] last Mon. for same-day drainage. Went home. Went to f/u clinic on Thurs., where they did a packing change. Upon arriving home, started bleeding. Returned to clinic, where MD sutured a vessel and put packing in. They were sent from clinic to the ED and admitted for Obs. Discharged Sat., Wound RN saw him at home Sun. and sent him to the ED b/c there was darkening to the gauze that was in there. Was given Augmentin. No F or purulent drainage. Here for referral to new Gen Surg and Wound Care.

## 2025-05-07 NOTE — ED ADULT TRIAGE NOTE - CHIEF COMPLAINT QUOTE
here for wound check s/p I&D of a tailbone abscess. repots surgeon at [facility] left a gauze in the wound and mother does not feel comfortable contouring care with [facility]. here seeking wound check and to establish care with a  different general surgeon, Elva of Autism, Seizure, ADHD. Also seeking to establish visiting RN visits for wound care. here for wound check s/p I&D of a tailbone abscess. repots surgeon at [facility] left a gauze in the wound and mother does not feel comfortable continuing care with [facility]. here seeking wound check and to establish care with a  different general surgeon, Elva of Autism, Seizure, ADHD. Also seeking to establish visiting RN visits for wound care.

## 2025-05-07 NOTE — ED PROVIDER NOTE - PATIENT PORTAL LINK FT
You can access the FollowMyHealth Patient Portal offered by [facility] by registering at the following website: [email or web address]. By joining [employer]’s FollowMyHealth portal, you will also be able to view your health information using other applications (apps) compatible with our system.

## 2025-05-07 NOTE — ED PROVIDER NOTE - PHYSICAL EXAMINATION
Well-appearing, well nourished, awake, alert, oriented to person, place, time/situation and in no apparent distress.    Airway patent. Neck supple.    Eyes without scleral injection. No jaundice.    Strong pulse.    Respirations unlabored.    Abdomen soft, non-tender, no guarding.    MSK: Spine appears normal, range of motion is not limited, no muscle or joint tenderness.    Alert and oriented, no gross motor or sensory deficits.    Skin: L medial buttock-area wound that's 2 inches deep; no pus or spreading redness or tenderness.    No SI/HI. Well-appearing, well nourished, awake, alert, oriented to person, place, time/situation and in no apparent distress.    Airway patent. Neck supple.    Eyes without scleral injection. No jaundice.    Strong pulse.    Respirations unlabored.    Abdomen soft, non-tender, no guarding.    MSK: Spine appears normal, range of motion is not limited, no muscle or joint tenderness.    Alert and oriented, no gross motor or sensory deficits.    Skin: L medial buttock-area wound that's 1.5 inches wide and 2 inches deep; stitches remain in the wall on the inside of the wound. The inside of the wound is pink; no pus or spreading redness or tenderness.    No SI/HI.

## 2025-05-07 NOTE — ED PROVIDER NOTE - NSFOLLOWUPINSTRUCTIONS_ED_ALL_ED_FT
Continue packing changes    Follow with new General Surgery and Wound Care    Return if fever or spreading redness or pus Continue packing changes    Follow with new General Surgery and Wound Care, as scheduled    Return if fever or spreading redness or pus

## 2025-05-07 NOTE — ED PROVIDER NOTE - CARE PLAN
Nicole,  I just wanted to let you know how appreciative this patient was with your teaching on the FODMAP diet.  Following it has completely resolved her diarrhea and I thought I would pass on the good news.  Thanks, Dara Norris, CNP  Principal Discharge DX:	Visit for wound check   1

## 2025-05-09 ENCOUNTER — OUTPATIENT (OUTPATIENT)
Dept: OUTPATIENT SERVICES | Facility: HOSPITAL | Age: 25
LOS: 1 days | End: 2025-05-09
Payer: MEDICAID

## 2025-05-09 ENCOUNTER — APPOINTMENT (OUTPATIENT)
Dept: WOUND CARE | Facility: HOSPITAL | Age: 25
End: 2025-05-09
Payer: MEDICAID

## 2025-05-09 VITALS
SYSTOLIC BLOOD PRESSURE: 112 MMHG | TEMPERATURE: 98.1 F | HEART RATE: 97 BPM | OXYGEN SATURATION: 99 % | DIASTOLIC BLOOD PRESSURE: 76 MMHG | RESPIRATION RATE: 16 BRPM

## 2025-05-09 DIAGNOSIS — T14.8XXA OTHER INJURY OF UNSPECIFIED BODY REGION, INITIAL ENCOUNTER: ICD-10-CM

## 2025-05-09 DIAGNOSIS — S31.829A UNSPECIFIED OPEN WOUND OF LEFT BUTTOCK, INITIAL ENCOUNTER: ICD-10-CM

## 2025-05-09 PROCEDURE — 99205 OFFICE O/P NEW HI 60 MIN: CPT

## 2025-05-09 PROCEDURE — G0463: CPT

## 2025-05-09 RX ORDER — TOPIRAMATE 100 MG/1
100 CAPSULE, EXTENDED RELEASE ORAL
Refills: 0 | Status: ACTIVE | COMMUNITY

## 2025-05-09 RX ORDER — OXYCODONE 5 MG/1
5 TABLET ORAL
Refills: 0 | Status: ACTIVE | COMMUNITY

## 2025-05-09 RX ORDER — AMOXICILLIN AND CLAVULANATE POTASSIUM 875; 125 MG/1; 1/1
875-125 TABLET, FILM COATED ORAL
Refills: 0 | Status: ACTIVE | COMMUNITY

## 2025-05-09 RX ORDER — GUAIFENESIN 1200 MG/1
500 TABLET, EXTENDED RELEASE ORAL
Refills: 0 | Status: ACTIVE | COMMUNITY

## 2025-05-09 RX ORDER — NALOXONE HYDROCHLORIDE NASAL 4 MG/.1ML
4 SPRAY NASAL
Refills: 0 | Status: ACTIVE | COMMUNITY

## 2025-05-09 RX ORDER — LORATADINE 5 MG
17 TABLET,CHEWABLE ORAL
Refills: 0 | Status: ACTIVE | COMMUNITY

## 2025-05-09 RX ORDER — DIVALPROEX SODIUM 250 MG/1
250 TABLET, DELAYED RELEASE ORAL
Refills: 0 | Status: ACTIVE | COMMUNITY

## 2025-05-09 RX ORDER — IBUPROFEN 600 MG/1
600 TABLET, FILM COATED ORAL
Refills: 0 | Status: ACTIVE | COMMUNITY

## 2025-05-12 ENCOUNTER — APPOINTMENT (OUTPATIENT)
Dept: SURGERY | Facility: CLINIC | Age: 25
End: 2025-05-12
Payer: MEDICAID

## 2025-05-12 VITALS
SYSTOLIC BLOOD PRESSURE: 111 MMHG | TEMPERATURE: 98 F | HEIGHT: 68 IN | BODY MASS INDEX: 19.1 KG/M2 | WEIGHT: 126 LBS | HEART RATE: 84 BPM | DIASTOLIC BLOOD PRESSURE: 74 MMHG

## 2025-05-12 PROCEDURE — 99212 OFFICE O/P EST SF 10 MIN: CPT

## 2025-05-19 ENCOUNTER — APPOINTMENT (OUTPATIENT)
Dept: SURGERY | Facility: CLINIC | Age: 25
End: 2025-05-19
Payer: MEDICAID

## 2025-05-19 VITALS
TEMPERATURE: 98 F | BODY MASS INDEX: 19.1 KG/M2 | SYSTOLIC BLOOD PRESSURE: 108 MMHG | HEART RATE: 98 BPM | DIASTOLIC BLOOD PRESSURE: 71 MMHG | HEIGHT: 68 IN | WEIGHT: 126 LBS

## 2025-05-19 PROCEDURE — 99211 OFF/OP EST MAY X REQ PHY/QHP: CPT

## 2025-05-28 ENCOUNTER — APPOINTMENT (OUTPATIENT)
Dept: SURGERY | Facility: CLINIC | Age: 25
End: 2025-05-28
Payer: MEDICAID

## 2025-05-28 ENCOUNTER — NON-APPOINTMENT (OUTPATIENT)
Age: 25
End: 2025-05-28

## 2025-05-28 VITALS
BODY MASS INDEX: 19.1 KG/M2 | SYSTOLIC BLOOD PRESSURE: 106 MMHG | HEIGHT: 68 IN | WEIGHT: 126 LBS | DIASTOLIC BLOOD PRESSURE: 77 MMHG | RESPIRATION RATE: 18 BRPM | HEART RATE: 70 BPM | OXYGEN SATURATION: 97 % | TEMPERATURE: 98.3 F

## 2025-05-28 PROCEDURE — 99212 OFFICE O/P EST SF 10 MIN: CPT

## 2025-06-06 ENCOUNTER — OUTPATIENT (OUTPATIENT)
Dept: OUTPATIENT SERVICES | Facility: HOSPITAL | Age: 25
LOS: 1 days | End: 2025-06-06
Payer: MEDICAID

## 2025-06-06 ENCOUNTER — APPOINTMENT (OUTPATIENT)
Dept: WOUND CARE | Facility: HOSPITAL | Age: 25
End: 2025-06-06
Payer: MEDICAID

## 2025-06-06 VITALS
TEMPERATURE: 97.2 F | SYSTOLIC BLOOD PRESSURE: 115 MMHG | RESPIRATION RATE: 18 BRPM | HEART RATE: 99 BPM | DIASTOLIC BLOOD PRESSURE: 80 MMHG | OXYGEN SATURATION: 98 %

## 2025-06-06 DIAGNOSIS — Z90.89 ACQUIRED ABSENCE OF OTHER ORGANS: Chronic | ICD-10-CM

## 2025-06-06 PROCEDURE — 99214 OFFICE O/P EST MOD 30 MIN: CPT

## 2025-06-06 PROCEDURE — G0463: CPT

## 2025-06-09 ENCOUNTER — APPOINTMENT (OUTPATIENT)
Dept: SURGERY | Facility: CLINIC | Age: 25
End: 2025-06-09
Payer: MEDICAID

## 2025-06-09 VITALS
DIASTOLIC BLOOD PRESSURE: 76 MMHG | TEMPERATURE: 98.5 F | SYSTOLIC BLOOD PRESSURE: 115 MMHG | HEIGHT: 68 IN | WEIGHT: 128 LBS | BODY MASS INDEX: 19.4 KG/M2 | HEART RATE: 96 BPM

## 2025-06-09 PROCEDURE — 99213 OFFICE O/P EST LOW 20 MIN: CPT

## 2025-06-17 ENCOUNTER — OUTPATIENT (OUTPATIENT)
Dept: OUTPATIENT SERVICES | Facility: HOSPITAL | Age: 25
LOS: 1 days | End: 2025-06-17

## 2025-06-17 VITALS
DIASTOLIC BLOOD PRESSURE: 82 MMHG | RESPIRATION RATE: 18 BRPM | TEMPERATURE: 98 F | SYSTOLIC BLOOD PRESSURE: 117 MMHG | HEART RATE: 82 BPM | WEIGHT: 132.5 LBS | OXYGEN SATURATION: 98 % | HEIGHT: 68 IN

## 2025-06-17 DIAGNOSIS — R56.9 UNSPECIFIED CONVULSIONS: ICD-10-CM

## 2025-06-17 DIAGNOSIS — Z01.818 ENCOUNTER FOR OTHER PREPROCEDURAL EXAMINATION: ICD-10-CM

## 2025-06-17 DIAGNOSIS — E03.9 HYPOTHYROIDISM, UNSPECIFIED: ICD-10-CM

## 2025-06-17 DIAGNOSIS — Z98.890 OTHER SPECIFIED POSTPROCEDURAL STATES: Chronic | ICD-10-CM

## 2025-06-17 DIAGNOSIS — L05.91 PILONIDAL CYST WITHOUT ABSCESS: ICD-10-CM

## 2025-06-17 DIAGNOSIS — J45.909 UNSPECIFIED ASTHMA, UNCOMPLICATED: ICD-10-CM

## 2025-06-17 DIAGNOSIS — Z90.89 ACQUIRED ABSENCE OF OTHER ORGANS: Chronic | ICD-10-CM

## 2025-06-17 LAB
ANION GAP SERPL CALC-SCNC: 7 MMOL/L — SIGNIFICANT CHANGE UP (ref 5–17)
BASOPHILS # BLD AUTO: 0.02 K/UL — SIGNIFICANT CHANGE UP (ref 0–0.2)
BASOPHILS NFR BLD AUTO: 0.3 % — SIGNIFICANT CHANGE UP (ref 0–2)
BUN SERPL-MCNC: 15 MG/DL — SIGNIFICANT CHANGE UP (ref 7–23)
CALCIUM SERPL-MCNC: 9.1 MG/DL — SIGNIFICANT CHANGE UP (ref 8.5–10.1)
CHLORIDE SERPL-SCNC: 109 MMOL/L — HIGH (ref 96–108)
CO2 SERPL-SCNC: 23 MMOL/L — SIGNIFICANT CHANGE UP (ref 22–31)
CREAT SERPL-MCNC: 0.94 MG/DL — SIGNIFICANT CHANGE UP (ref 0.5–1.3)
EGFR: 117 ML/MIN/1.73M2 — SIGNIFICANT CHANGE UP
EGFR: 117 ML/MIN/1.73M2 — SIGNIFICANT CHANGE UP
EOSINOPHIL # BLD AUTO: 0.05 K/UL — SIGNIFICANT CHANGE UP (ref 0–0.5)
EOSINOPHIL NFR BLD AUTO: 0.7 % — SIGNIFICANT CHANGE UP (ref 0–6)
GLUCOSE SERPL-MCNC: 135 MG/DL — HIGH (ref 70–99)
HCT VFR BLD CALC: 44.8 % — SIGNIFICANT CHANGE UP (ref 39–50)
HGB BLD-MCNC: 14.3 G/DL — SIGNIFICANT CHANGE UP (ref 13–17)
IMM GRANULOCYTES NFR BLD AUTO: 0.1 % — SIGNIFICANT CHANGE UP (ref 0–0.9)
LYMPHOCYTES # BLD AUTO: 3.88 K/UL — HIGH (ref 1–3.3)
LYMPHOCYTES # BLD AUTO: 57.1 % — HIGH (ref 13–44)
MCHC RBC-ENTMCNC: 26.4 PG — LOW (ref 27–34)
MCHC RBC-ENTMCNC: 31.9 G/DL — LOW (ref 32–36)
MCV RBC AUTO: 82.8 FL — SIGNIFICANT CHANGE UP (ref 80–100)
MONOCYTES # BLD AUTO: 0.48 K/UL — SIGNIFICANT CHANGE UP (ref 0–0.9)
MONOCYTES NFR BLD AUTO: 7.1 % — SIGNIFICANT CHANGE UP (ref 2–14)
NEUTROPHILS # BLD AUTO: 2.36 K/UL — SIGNIFICANT CHANGE UP (ref 1.8–7.4)
NEUTROPHILS NFR BLD AUTO: 34.7 % — LOW (ref 43–77)
NRBC BLD AUTO-RTO: 0 /100 WBCS — SIGNIFICANT CHANGE UP (ref 0–0)
PLATELET # BLD AUTO: 211 K/UL — SIGNIFICANT CHANGE UP (ref 150–400)
POTASSIUM SERPL-MCNC: 3.6 MMOL/L — SIGNIFICANT CHANGE UP (ref 3.5–5.3)
POTASSIUM SERPL-SCNC: 3.6 MMOL/L — SIGNIFICANT CHANGE UP (ref 3.5–5.3)
RBC # BLD: 5.41 M/UL — SIGNIFICANT CHANGE UP (ref 4.2–5.8)
RBC # FLD: 15.9 % — HIGH (ref 10.3–14.5)
SODIUM SERPL-SCNC: 139 MMOL/L — SIGNIFICANT CHANGE UP (ref 135–145)
VALPROATE SERPL-MCNC: 69 UG/ML — SIGNIFICANT CHANGE UP (ref 50–100)
WBC # BLD: 6.8 K/UL — SIGNIFICANT CHANGE UP (ref 3.8–10.5)
WBC # FLD AUTO: 6.8 K/UL — SIGNIFICANT CHANGE UP (ref 3.8–10.5)

## 2025-06-17 RX ORDER — IMIPRAMINE HCL 50 MG
1 TABLET ORAL
Refills: 0 | DISCHARGE

## 2025-06-17 RX ORDER — RIBOFLAVIN (VITAMIN B2) 100 MG
1 TABLET ORAL
Refills: 0 | DISCHARGE

## 2025-06-17 RX ORDER — FLUOXETINE HYDROCHLORIDE 20 MG/1
1 CAPSULE ORAL
Refills: 0 | DISCHARGE

## 2025-06-17 RX ORDER — TOPIRAMATE 25 MG/1
1 TABLET, FILM COATED ORAL
Refills: 0 | DISCHARGE

## 2025-06-17 RX ORDER — LEVOTHYROXINE SODIUM 300 MCG
1 TABLET ORAL
Refills: 0 | DISCHARGE

## 2025-06-17 NOTE — H&P PST ADULT - NSANTHNECKRD_ENT_A_CORE
MD Miguel A Hope MD Tery Du, MD               Office: [phone]                      Fax: [phone]             [facility]                   NEPHROLOGY INITIAL CONSULT NOTE:     PATIENT NAME: Riddhi Colon  : 1936  MRN: [id number]  REASON FOR CONSULT: For evaluation and management of Hyponatremia . (My recommendations will be communicated by way of shared medical record.)      IMPRESSION:       Admitted for:  Cellulitis [L03.90]  Peripheral edema [R60.9]  Redness and swelling of lower leg [M79.89, R23.8]        Hyponatremia :   : uncontrolled acute on Chronic, Moderate-mild range, no symptomatic, mild hyper-volemic:   - No Reported Severe symptoms: seizures, obtundation, coma, and respiratory arrest.   - no need for Hypertonic saline    - Risk factors for hyponatremia:   : hypokalemia   : HILTON (no h/o CKD) - pre-renal BL SCR ~0.6-0.7 --> peaked at 1.2 on 22 during admission   : Chronic diastolic HF -   - last ECHO -- Grade I diastolic dysfunction with normal LV filling pressures.  : weight ~ 160-162 lbs  :Viibryd    - Patient is at low risk of developing Osmotic Demyelination Syndrome.    - Call us urgently if any/worsening neurological findings.          RECOMMENDATIONS:     Work up: Order   - Serum Osm , UOsm , Zheng , Urine K, Uric acid,   - renal function - HILTON - as above   - other lytes w/ hypokalemia - mild   - BP not very low  , unlikely AI  - TSH check      Management:  - Monitor Serum Na ,  - Goal Serum Na mid-high 130s, by next /24 hours     - low dose Lasix 20 mg QD   - K - needs repletion   (Potassium is osmotically active as sodium, so giving K+ can raise the S [Na+] and osmolality in hyponatremic patients)     - minimize SSRI - on Viibryd  - avoid NSIDs PO, ok for a local cream  - ongoing control of cellulitis, pain - to decrease non-osmotic ADH release     - Minimize use any hypotonic/isotonic fluids such as D5W, NS, LR with other medications/drips ,   - Concentrate continuous drip fluids as much as possible,   - Avoid IVF , unless needed for resuscitation/hypovolemia w/ hypotension+tachycardia,    - Strict I/O with daily weights. --- Call us urgently if any/worsening neurological findings. Other major problems: Management per primary and other consulting teams. //   Bilateral leg cellulitis   Obesity       Hospital Problems             Last Modified POA    * (Principal) Cellulitis 9/2/2022 Yes    Dyslipidemia 9/7/2022 Yes    Class 1 obesity due to excess calories with body mass index (BMI) of 30.0 to 30.9 in adult 9/6/2022 Yes    History of depression 9/6/2022 Yes    Bilateral lower leg cellulitis 9/6/2022 Yes    Peripheral edema 9/6/2022 Yes    Redness and swelling of lower leg 9/6/2022 Yes    Weight loss counseling, encounter for 9/7/2022 Yes    High fever 9/6/2022 Yes    Current mild episode of major depressive disorder without prior episode ([facility] 9/3/2022 Yes     : other supportive care :   - Check daily renal function panel with electrolytes-phosphorus  - Strict monitoring of I/Os, daily weight  - non-Renal feeds/diet  - Current medications reviewed. Please refer to the orders. High Complexity. Multiple complex problems. Discussed with patient and treatment team- Soraida Austin - CNP  Time spent > 30 (~35) minutes. Thank you for allowing me to participate in this patient's care. Please do not hesitate to contact me anytime. We will follow along with you. Ruby Bell MD,  [facility] [id number] [place] [address] [place]: [phone] or [facility]  Fax: [phone]        =======================================================================================   =======================================================================================      CHIEF COMPLAINT:   Chief Complaint   Patient presents with    Leg Swelling     Bilateral leg swelling that started a few weeks ago.  Worse over the last week. History Obtained From:  patient + treatment team + Electronic Medical Records    HPI: Ms. Michael Moss is a 82 y.o. female with significant past medical history as below:   Past Medical History:   Diagnosis Date    AR (allergic rhinitis)     Arthritis of knee     Pruis    Depression     Erosive gastritis 10/28/2019    EGD October 2019, he did with PPI    GERD (gastroesophageal reflux disease)     Hyperlipidemia     Internal hemorrhoids     Overweight(278.02)     Viral meningitis 5/12      Presents with Leg Swelling (Bilateral leg swelling that started a few weeks ago. Worse over the last week. )    Admitted with Cellulitis [L03.90]  Peripheral edema [R60.9]  Redness and swelling of lower leg [M79.89, R23.8] Found to have Hyponatremia , so we are called for that. *  Regarding: Hyponatremia   Duration: acute on chronic  Location: kidneys  Severity: Severe   Timing: continous  Context (ex: related to condition):  , refer to my assessment / impression. Modifying factors (ex: medications, interventions):  , refer to my plan / recommendation. Associated signs & symptoms (ex: edema, SOB): As mentioned above in CC and HPI      Past medical, Surgical, Social, Family medical history reviewed by me.      PAST MEDICAL HISTORY:   Past Medical History:   Diagnosis Date    AR (allergic rhinitis)     Arthritis of knee     Pruis    Depression     Erosive gastritis 10/28/2019    EGD October 2019, he did with PPI    GERD (gastroesophageal reflux disease)     Hyperlipidemia     Internal hemorrhoids     Overweight(278.02)     Viral meningitis 5/12     PAST SURGICAL HISTORY:   Past Surgical History:   Procedure Laterality Date    BREAST BIOPSY      CHOLECYSTECTOMY, LAPAROSCOPIC  2003    COLONOSCOPY  8/06    normal; [facility]    COLONOSCOPY  12/3/13    Ortega- polyps    HIP SURGERY Left 5/16/2022    LEFT HIP HEMIARTHROPLASTY performed by Kelly Nieves MD at [address] [place] [address] (CERVIX STATUS UNKNOWN)      AKASH AND BSO (CERVIX REMOVED)  2003    UPPER GASTROINTESTINAL ENDOSCOPY  12/3/13    Lurene Smaller; antritis     FAMILY HISTORY:   Family History   Problem Relation Age of Onset    Breast Cancer Mother     Bipolar Disorder Brother      SOCIAL HISTORY:   Social History     Socioeconomic History    Marital status:      Spouse name: Milagro Median    Number of children: 4    Years of education: None    Highest education level: None   Tobacco Use    Smoking status: Never    Smokeless tobacco: Never   Vaping Use    Vaping Use: Never used   Substance and Sexual Activity    Alcohol use: Yes     Comment: rarely    Drug use: No     Social Determinants of Health     Financial Resource Strain: Low Risk     Difficulty of Paying Living Expenses: Not hard at all   Food Insecurity: No Food Insecurity    Worried About [address] [employer] in the Last Year: Never true    [address] in the Last Year: Never true         MEDICATIONS: reviewed by me. Medications Prior to Admission:  No current facility-administered medications on file prior to encounter. Current Outpatient Medications on File Prior to Encounter   Medication Sig Dispense Refill    atorvastatin (LIPITOR) 10 MG tablet TAKE 1 TABLET BY MOUTH EVERY OTHER DAY **CHANGE IN DOSE** 45 tablet 3    buPROPion (WELLBUTRIN XL) 150 MG extended release tablet TAKE 1 TABLET (150 MG TOTAL) BY MOUTH DAILY. INDICATIONS  DEPRESSION      vilazodone HCl (VIIBRYD) 10 MG TABS Take 20 mg by mouth daily       B Complex-C (VITAMIN B + C COMPLEX PO) Take by mouth      Lift Chair MISC by Does not apply route 1 each 0    estradiol (ESTRACE) 0.1 MG/GM vaginal cream Place 2 g vaginally Twice a Week       aspirin 81 MG EC tablet Take 81 mg by mouth daily. VITAMIN D PO Take  by mouth.            Current Facility-Administered Medications:     potassium chloride (KLOR-CON M) extended release tablet 20 mEq, 20 mEq, Oral, BID Radha SHEPHERD APRN - CNP, 20 mEq at 09/08/22 0855    pantoprazole (PROTONIX) tablet 40 mg, 40 mg, Oral, QAM AC, CIERRA Vázquez - CNP, 40 mg at 09/08/22 0855    atorvastatin (LIPITOR) tablet 10 mg, 10 mg, Oral, Every Other Day, Kathe Carroll MD, 10 mg at 09/06/22 2126    gabapentin (NEURONTIN) capsule 100 mg, 100 mg, Oral, TID, CIERRA Isaac - CNP, 100 mg at 09/08/22 0855    busPIRone (BUSPAR) tablet 10 mg, 10 mg, Oral, Q6H PRN, CIERRA Isaac - CNP, 10 mg at 09/06/22 0852    HYDROcodone-acetaminophen (Lynder Benders) 5-325 MG per tablet 1 tablet, 1 tablet, Oral, Q6H PRN, CIERRA Isaac - CNP, 1 tablet at 09/08/22 0502    calcium carbonate (TUMS) chewable tablet 500 mg, 500 mg, Oral, TID PRN, CIERRA Isaac - CNP, 500 mg at 09/04/22 1058    diclofenac sodium (VOLTAREN) 1 % gel 4 g, 4 g, Topical, TID, Barb De La Rosa MD, 4 g at 09/08/22 0855    lactobacillus (CULTURELLE) capsule 1 capsule, 1 capsule, Oral, BID WC, Barb De La Rosa MD, 1 capsule at 09/08/22 0855    sennosides-docusate sodium (SENOKOT-S) 8.6-50 MG tablet 2 tablet, 2 tablet, Oral, Nightly, Barb De La Rosa MD, 2 tablet at 09/07/22 1958    aspirin EC tablet 81 mg, 81 mg, Oral, Daily, Kathe Carroll MD, 81 mg at 09/08/22 0855    buPROPion (WELLBUTRIN XL) extended release tablet 150 mg, 150 mg, Oral, Daily, Kathe Carroll MD, 150 mg at 09/08/22 0855    vilazodone HCl (VIIBRYD) TABS 20 mg, 20 mg, Oral, Daily, Kathe Carroll MD, 20 mg at 09/08/22 0855    ceFAZolin (ANCEF) 2,000 mg in dextrose 5 % 50 mL IVPB (mini-bag), 2,000 mg, IntraVENous, Q8H, Kathe Carroll MD, Last Rate: 100 mL/hr at 09/08/22 0858, 2,000 mg at 09/08/22 0858    sodium chloride flush 0.9 % injection 5-40 mL, 5-40 mL, IntraVENous, 2 times per day, Kathe Carroll MD, 10 mL at 09/07/22 0751    sodium chloride flush 0.9 % injection 5-40 mL, 5-40 mL, IntraVENous, PRN, Sven Stafford MD    0.9 % sodium chloride infusion, , IntraVENous, PRN, Kathe Carroll MD, Last Rate: 10 mL/hr at 09/07/22 0142, New Bag at 09/07/22 0142    enoxaparin (LOVENOX) injection 40 mg, 40 mg, SubCUTAneous, Daily, Sven Stafford MD, 40 mg at 09/08/22 0854    ondansetron (ZOFRAN-ODT) disintegrating tablet 4 mg, 4 mg, Oral, Q8H PRN **OR** ondansetron (ZOFRAN) injection 4 mg, 4 mg, IntraVENous, Q6H PRN, Sven Stafford MD, 4 mg at 09/03/22 0522    polyethylene glycol (GLYCOLAX) packet 17 g, 17 g, Oral, Daily PRN, Blake Lo MD    acetaminophen (TYLENOL) tablet 650 mg, 650 mg, Oral, Q6H PRN, 650 mg at 09/04/22 0511 **OR** acetaminophen (TYLENOL) suppository 650 mg, 650 mg, Rectal, Q6H PRN, Sven Stafford MD    perflutren lipid microspheres (DEFINITY) injection 1.65 mg, 1.5 mL, IntraVENous, ONCE PRN, Blake Lo MD      Allergies reviewed by me: Patient has no known allergies. REVIEW OF SYSTEMS:  As mentioned in chief complaint and HPI , Subjective   All other 10-point review of systems: negative.            =======================================================================================     PHYSICAL EXAM:  Recent vital signs and recent I/Os reviewed by me. Wt Readings from Last 3 Encounters:   09/06/22 160 lb 12.8 oz (72.9 kg)   09/02/22 181 lb 12.8 oz (82.5 kg)   08/16/22 150 lb (68 kg)     BP Readings from Last 3 Encounters:   09/08/22 109/62   09/02/22 (!) 140/70   07/29/22 134/60     Patient Vitals for the past 24 hrs:   BP Temp Temp src Pulse Resp SpO2   09/08/22 0830 109/62 97.8 °F (36.6 °C) Oral 84 16 94 %   09/08/22 0445 130/67 98.8 °F (37.1 °C) Oral 87 16 93 %   09/08/22 0100 132/70 98.9 °F (37.2 °C) Oral 88 16 96 %   09/07/22 1957 (!) 115/57 98.6 °F (37 °C) Oral 92 16 93 %   09/07/22 1704 -- -- -- -- 16 --   09/07/22 1634 126/64 98 °F (36.7 °C) Oral 97 16 95 %       Intake/Output Summary (Last 24 hours) at 9/8/2022 0940  Last data filed at 9/7/2022 1810  Gross per 24 hour   Intake --   Output 600 ml   Net -600 ml         Physical Exam  Vitals reviewed. Constitutional:       General: She is not in acute distress. Appearance: Normal appearance. HENT:      Head: Normocephalic and atraumatic. Right Ear: External ear normal.      Left Ear: External ear normal.      Nose: Nose normal.      Mouth/Throat:      Mouth: Mucous membranes are moist. Mucous membranes are not dry. Eyes:      General: No scleral icterus. Conjunctiva/sclera: Conjunctivae normal.   Neck:      Vascular: No JVD. Cardiovascular:      Rate and Rhythm: Normal rate and regular rhythm. Heart sounds: S1 normal and S2 normal.   Pulmonary:      Effort: Pulmonary effort is normal. No respiratory distress. Breath sounds: Rhonchi present. Abdominal:      General: Bowel sounds are normal. There is no distension. Musculoskeletal:         General: Swelling present. No deformity. Cervical back: Normal range of motion and neck supple. Skin:     General: Skin is dry. Coloration: Skin is not jaundiced. Findings: Erythema present. Neurological:      Mental Status: She is alert and oriented to person, place, and time. Mental status is at baseline. Psychiatric:         Mood and Affect: Mood normal.         Behavior: Behavior normal.          =======================================================================================     DATA:  Diagnostic tests reviewed by me for today's visit:   (AS NEEDED FOR MY EVALUATION AND MANAGEMENT).        Recent Labs     09/06/22  1056 09/07/22  0832 09/08/22  0440   WBC 7.4 7.3 6.0   HCT 35.8* 31.4* 29.4*   * 130* 132*     Iron Saturation:  No components found for: PERCENTFE  FERRITIN:  No results found for: FERRITIN  IRON:    Lab Results   Component Value Date/Time    IRON 44 03/05/2010 07:54 AM     TIBC:  No results found for: TIBC    Recent Labs     09/06/22  1056 09/07/22  0832 09/08/22  0440   * 131* 131*   K 3.5 3.5 3.2*   CL 92* 92* 92*   CO2 27 30 28   BUN 35* 42* 45*   CREATININE 1.1 1.2 1.1     Recent Labs     09/06/22  1056 09/07/22  0832 09/08/22  0440   CALCIUM 8.5 8.7 8.3     No results for input(s): PH, PCO2, PO2 in the last 72 hours.     Invalid input(s): Page Fee    ABG:  No results found for: PH, PCO2, PO2, HCO3, BE, THGB, TCO2, O2SAT  VBG:  No results found for: PHVEN, ADO6VVT, BEVEN, P6FJMXAF    LDH:  No results found for: LDH  Uric Acid:    Lab Results   Component Value Date/Time    LABURIC 5.2 09/04/2022 05:01 AM       PT/INR:    Lab Results   Component Value Date/Time    PROTIME 12.2 05/16/2022 09:06 AM    INR 1.08 05/16/2022 09:06 AM     Warfarin PT/INR:  No components found for: PTPATWAR, PTINRWAR  PTT:    Lab Results   Component Value Date/Time    APTT 30.0 05/16/2022 09:06 AM   [APTT}  Last 3 Troponin:    Lab Results   Component Value Date/Time    TROPONINI <0.01 09/02/2022 12:24 PM    TROPONINI <0.01 05/15/2022 09:56 AM    TROPONINI <0.01 07/27/2021 05:01 PM       U/A:    Lab Results   Component Value Date/Time    NITRITE neg 08/04/2021 02:33 PM    COLORU Yellow 09/06/2022 11:35 PM    PROTEINU TRACE 09/06/2022 11:35 PM    PHUR 5.5 09/06/2022 11:35 PM    WBCUA 6-9 09/06/2022 11:35 PM    RBCUA None seen 09/06/2022 11:35 PM    BACTERIA Rare 09/06/2022 11:35 PM    CLARITYU Clear 09/06/2022 11:35 PM    SPECGRAV 1.015 09/06/2022 11:35 PM    LEUKOCYTESUR MODERATE 09/06/2022 11:35 PM    UROBILINOGEN 0.2 09/06/2022 11:35 PM    BILIRUBINUR Negative 09/06/2022 11:35 PM    BILIRUBINUR neg 08/04/2021 02:33 PM    BILIRUBINUR NEGATIVE 06/01/2012 06:20 AM    BLOODU Negative 09/06/2022 11:35 PM    GLUCOSEU Negative 09/06/2022 11:35 PM    GLUCOSEU NEGATIVE 06/01/2012 06:20 AM     Microalbumen/Creatinine ratio:  No components found for: RUCREAT  24 Hour Urine for Protein:  No components found for: RAWUPRO, UHRS3, CBNH59NX, UTV3  24 Hour Urine for Creatinine Clearance:  No components found for: CREAT4, UHRS10, UTV10  Urine Toxicology:  No components found for: IAMMENTA, IBARBIT, IBENZO, ICOCAINE, IMARTHC, IOPIATES, IPHENCYC    HgBA1c:    Lab Results   Component Value Date/Time    LABA1C 4.9 10/06/2021 08:16 AM     RPR:  No results found for: RPR  HIV:  No results found for: HIV  ROMEO:  No results found for: ANATITER, ROMEO  RF:  No results found for: RF  DSDNA:  No components found for: DNA  AMYLASE:  No results found for: AMYLASE  LIPASE:    Lab Results   Component Value Date/Time    LIPASE 21.0 10/22/2019 03:08 PM     Fibrinogen Level:  No components found for: FIB       BELOW MENTIONED RADIOLOGY STUDY RESULTS BY ME (AS NEEDED FOR MY EVALUATION AND MANAGEMENT). Echo Complete    Result Date: 9/3/2022  Transthoracic Echocardiography Report (TTE)  Demographics   Patient Name       [facility]   Date of Study      09/03/2022        Gender              Female   Patient Number     [id number]        Date of Birth       1936   Visit Number       [id number]         Age                 82 year(s)   Accession Number   [id number]        Room Number         46   Corporate ID       [id number]          Sonographer         Kyleigh Ramirez, T   Ordering Physician Mamie Lantigua MD Interpreting        [facility] MD,                                       Physician Gina Parikh  Procedure Type of Study   TTE procedure:ECHOCARDIOGRAM COMPLETE 2D W DOPPLER W COLOR. Procedure Date Date: 09/03/2022 Start: 07:24 AM Study Location: Portable Technical Quality: Adequate visualization Indications:Dyspnea/SOB. Patient Status: Routine Height: 61 inches Weight: 181 pounds BSA: 1.81 m2 BMI: 34.2 kg/m2 BP: 133/60 mmHg  Conclusions   Summary  Mild septal left ventricular hypertrophy. Normal left ventricle size and  systolic function with an estimated ejection fraction of 55-60%. No regional  wall motion abnormalities are seen. Grade I diastolic dysfunction with normal LV filling pressures. Left sided pleural effusion noted.    Signature   ------------------------------------------------------------------  Electronically signed by Radha Sinclair MD, [facility] [place], [address]  (Interpreting physician) on 09/03/2022 at 12:49 PM  ------------------------------------------------------------------   Findings   Left Ventricle  Mild septal left ventricular hypertrophy. Normal left ventricle size and  systolic function with an estimated ejection fraction of 55-60%. No regional  wall motion abnormalities are seen. Grade I diastolic dysfunction with normal LV filling pressures. Avg. E/e'=  13.5. Mitral Valve  The mitral valve is thickened with adequate excursion. Trivial mitral  regurgitation. No evidence of mitral stenosis. Left Atrium  The left atrium is normal in size. Aortic Valve  The aortic valve is sclerotic with adequate excursion. Trivial aortic  insufficiency. No evidence of aortic stenosis. Aorta  The aortic root is normal in size. Right Ventricle  The right ventricle is normal in size and function. TAPSE= 2.66 cm. Tricuspid Valve  The tricuspid valve is normal in structure and function. Trivial tricuspid  regurgitation. No evidence of tricuspid stenosis. Right Atrium  The right atrial size is normal.   Pulmonic Valve  The pulmonic valve is not well visualized. Trivial pulmonic insufficiency. No evidence of pulmonic stenosis. Pericardial Effusion  No pericardial effusion noted. Pleural Effusion  Left sided pleural effusion noted. Miscellaneous  The inferior vena cava appears normal in size with normal respiratory  variation.   M-Mode/2D Measurements (cm)   LV Diastolic Dimension: 4.7 cm LV Systolic Dimension: 1.13 cm  LV Septum Diastolic: 9.98 cm  LV PW Diastolic: 4.00 cm       AO Root Dimension: 3 cm                                 LA Dimension: 4 cm                                 LA Area: 17.7 cm2  LVOT: 2 cm                     LA volume/Index: 46.1 ml /25 ml/m2  Doppler Measurements   AV Peak Velocity: 144 cm/s    MV Peak E-Wave: 90.2 cm/s  AV Peak Gradient: 8.29 mmHg   MV Peak A-Wave: 121 cm/s  AV Mean Gradient: 5 mmHg MV E/A Ratio: 0.75  LVOT Peak Velocity: 99.8 cm/s  AV Area (Continuity):2.4 cm2   E' Septal Velocity: 5.44 cm/s  E' Lateral Velocity: 8.7 cm/s  PV Peak Velocity: 85.3 cm/s  PV Peak Gradient: 2.91 mmHg   Aortic Valve   Peak Velocity: 144 cm/s    Mean Velocity: 103 cm/s  Peak Gradient: 8.29 mmHg   Mean Gradient: 5 mmHg  Area (continuity): 2.4 cm2  AV VTI: 32.8 cm  Aorta   Aortic Root: 3 cm  Ascending Aorta: 2.9 cm  LVOT Diameter: 2 cm      XR CHEST (2 VW)    Result Date: 9/6/2022  EXAMINATION: TWO XRAY VIEWS OF THE CHEST 9/6/2022 1:18 pm COMPARISON: 07/27/2021 HISTORY: ORDERING SYSTEM PROVIDED HISTORY: fever TECHNOLOGIST PROVIDED HISTORY: Reason for exam:->fever Reason for Exam: fever FINDINGS: . The cardiac size is mildly enlarged, however stable. No acute infiltrates or pleural effusions are seen. Pulmonary vascularity appears normal. There is mild ectasia of the thoracic aorta. Stable mild compression T8 vertebral body. .No acute bony abnormalities.  The hilar structures are normal.     No acute cardiopulmonary disease     VL Extremity Venous Bilateral    Result Date: 9/2/2022  Vascular Lower Extremities DVT Study Procedure -- PRELIMINARY SONOGRAPHER REPORT --   Demographics   Patient Name       [facility]   Date of Study      09/02/2022         Gender              Female   Patient Number     [id number]         Date of Birth       1936   Visit Number       [id number]          Age                 82 year(s)   Accession Number   [id number]         Room Number            Corporate ID       [id number]           Sonographer         Juanjose Louise RVT, RDMS, AB,                                                            OB/GYN   Ordering Physician Nam Naylor,  Interpreting        Kelle Woody MD                     CNA                Physician  Procedure Type of Study:   Veins:Lower Extremities DVT Study, VASC EXTREMITY VENOUS DUPLEX motion abnormalities are seen. Grade I diastolic dysfunction with normal LV filling pressures. Left sided pleural effusion noted.       Signature      ------------------------------------------------------------------   Electronically signed by Kayode Main MD, [facility] [place], [address]   (Interpreting physician) on 09/03/2022 at 12:49 PM   ------------------------------------------------------------------ No

## 2025-06-17 NOTE — H&P PST ADULT - HISTORY OF PRESENT ILLNESS
26 yo male on the  spectrum with special needs , bipolar, hypothyroid, asthma, seizure -controlled on meds - here  with his mom  and  helping with answering questions , pt in no distress however vasovagal with blood draw .    patient had pilonidal cyst surgery om 4/2025 elsewhere  and there was some complications where the patient bleed and was hospitalized - has issues with gauze packing at the site also . had reevaluation with Dr. kelly - pilonidal cyst abscess - scheduled for  excision of pilonidal cyst.

## 2025-06-17 NOTE — H&P PST ADULT - ASSESSMENT
pilonidal cyst  CAPRINI SCORE    AGE RELATED RISK FACTORS                                                             [ ] Age 41-60 years                                            (1 Point)  [ ] Age: 61-74 years                                           (2 Points)                 [ ] Age= 75 years                                                (3 Points)             DISEASE RELATED RISK FACTORS                                                       [ ] Edema in the lower extremities                 (1 Point)                     [ ] Varicose veins                                               (1 Point)                                 x[ ] BMI > 25 Kg/m2                                            (1 Point)                                  [ ] Serious infection (ie PNA)                            (1 Point)                     [ ] Lung disease ( COPD, Emphysema)            (1 Point)                                                                          [ ] Acute myocardial infarction                         (1 Point)                  [ ] Congestive heart failure (in the previous month)  (1 Point)         [ ] Inflammatory bowel disease                            (1 Point)                  [ ] Central venous access, PICC or Port               (2 points)       (within the last month)                                                                [ ] Stroke (in the previous month)                        (5 Points)    [ ] Previous or present malignancy                       (2 points)                                                                                                                                                         HEMATOLOGY RELATED FACTORS                                                         [ ] Prior episodes of VTE                                     (3 Points)                     [ ] Positive family history for VTE                      (3 Points)                  [ ] Prothrombin 19866 A                                     (3 Points)                     [ ] Factor V Leiden                                                (3 Points)                        [ ] Lupus anticoagulants                                      (3 Points)                                                           [ ] Anticardiolipin antibodies                              (3 Points)                                                       [ ] High homocysteine in the blood                   (3 Points)                                             [ ] Other congenital or acquired thrombophilia      (3 Points)                                                [ ] Heparin induced thrombocytopenia                  (3 Points)                                        MOBILITY RELATED FACTORS  [ ] Bed rest                                                         (1 Point)  [ ] Plaster cast                                                    (2 points)  [ ] Bed bound for more than 72 hours           (2 Points)    GENDER SPECIFIC FACTORS  [ ] Pregnancy or had a baby within the last month   (1 Point)  [ ] Post-partum < 6 weeks                                   (1 Point)  [ ] Hormonal therapy  or oral contraception   (1 Point)  [ ] History of pregnancy complications              (1 point)  [ ] Unexplained or recurrent              (1 Point)    OTHER RISK FACTORS                                           (1 Point)  [ ] BMI >40, smoking, diabetes requiring insulin, chemotherapy  blood transfusions and length of surgery over 2 hours    SURGERY RELATED RISK FACTORS  [ ]  Section within the last month     (1 Point)  x[ ] Minor surgery                                                  (1 Point)  [ ] Arthroscopic surgery                                       (2 Points)  [ ] Planned major surgery lasting more            (2 Points)      than 45 minutes     [ ] Elective hip or knee joint replacement       (5 points)       surgery                                                TRAUMA RELATED RISK FACTORS  [ ] Fracture of the hip, pelvis, or leg                       (5 Points)  [ ] Spinal cord injury resulting in paralysis             (5 points)       (in the previous month)    [ ] Paralysis  (less than 1 month)                             (5 Points)  [ ] Multiple Trauma within 1 month                        (5 Points)    Total Score [   2     ]    Caprini Score 0-2: Low Risk, NO VTE prophylaxis required for most patients, encourage ambulation  Caprini Score 3-6: Moderate Risk , pharmacologic VTE prophylaxis is indicated for most patients (in the absence of contraindications)  Caprini Score Greater than or =7: High risk, pharmocologic VTE prophylaxis indicated for most patients (in the absence of contraindications)

## 2025-06-20 ENCOUNTER — APPOINTMENT (OUTPATIENT)
Dept: WOUND CARE | Facility: HOSPITAL | Age: 25
End: 2025-06-20

## 2025-06-24 DIAGNOSIS — S31.829A UNSPECIFIED OPEN WOUND OF LEFT BUTTOCK, INITIAL ENCOUNTER: ICD-10-CM

## 2025-06-24 DIAGNOSIS — T14.8XXA OTHER INJURY OF UNSPECIFIED BODY REGION, INITIAL ENCOUNTER: ICD-10-CM
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
## 2025-06-26 ENCOUNTER — APPOINTMENT (OUTPATIENT)
Dept: SURGERY | Facility: HOSPITAL | Age: 25
End: 2025-06-26

## 2025-07-03 ENCOUNTER — TRANSCRIPTION ENCOUNTER (OUTPATIENT)
Age: 25
End: 2025-07-03

## 2025-07-03 ENCOUNTER — APPOINTMENT (OUTPATIENT)
Dept: SURGERY | Facility: HOSPITAL | Age: 25
End: 2025-07-03

## 2025-07-03 RX ORDER — DIAZEPAM 5 MG/1
1 TABLET ORAL
Refills: 0 | DISCHARGE

## 2025-07-03 RX ORDER — LEVALBUTEROL HYDROCHLORIDE 1.25 MG/3ML
1 SOLUTION RESPIRATORY (INHALATION)
Refills: 0 | DISCHARGE

## 2025-07-03 RX ORDER — FLUTICASONE PROPIONATE 220 UG/1
2 AEROSOL, METERED RESPIRATORY (INHALATION)
Refills: 0 | DISCHARGE

## 2025-07-03 RX ORDER — ERYTHROMYCIN 5 MG/G
1 OINTMENT OPHTHALMIC
Refills: 0 | DISCHARGE

## 2025-07-15 PROCEDURE — G0463: CPT

## 2025-07-16 ENCOUNTER — APPOINTMENT (OUTPATIENT)
Dept: WOUND CARE | Facility: CLINIC | Age: 25
End: 2025-07-16
Payer: MEDICAID

## 2025-07-16 PROCEDURE — 99214 OFFICE O/P EST MOD 30 MIN: CPT
